# Patient Record
Sex: MALE | Race: BLACK OR AFRICAN AMERICAN | NOT HISPANIC OR LATINO | Employment: UNEMPLOYED | URBAN - METROPOLITAN AREA
[De-identification: names, ages, dates, MRNs, and addresses within clinical notes are randomized per-mention and may not be internally consistent; named-entity substitution may affect disease eponyms.]

---

## 2017-04-12 ENCOUNTER — HOSPITAL ENCOUNTER (EMERGENCY)
Facility: HOSPITAL | Age: 57
Discharge: HOME | End: 2017-04-12
Payer: MEDICAID

## 2017-04-12 VITALS
TEMPERATURE: 98 F | SYSTOLIC BLOOD PRESSURE: 130 MMHG | HEART RATE: 77 BPM | HEIGHT: 65 IN | RESPIRATION RATE: 18 BRPM | DIASTOLIC BLOOD PRESSURE: 62 MMHG | OXYGEN SATURATION: 98 % | BODY MASS INDEX: 52.48 KG/M2 | WEIGHT: 315 LBS

## 2017-04-12 DIAGNOSIS — M54.31 SCIATICA OF RIGHT SIDE: Primary | ICD-10-CM

## 2017-04-12 PROCEDURE — 63600175 PHARM REV CODE 636 W HCPCS: Performed by: NURSE PRACTITIONER

## 2017-04-12 PROCEDURE — 96372 THER/PROPH/DIAG INJ SC/IM: CPT

## 2017-04-12 PROCEDURE — 99283 EMERGENCY DEPT VISIT LOW MDM: CPT | Mod: 25

## 2017-04-12 RX ORDER — KETOROLAC TROMETHAMINE 30 MG/ML
60 INJECTION, SOLUTION INTRAMUSCULAR; INTRAVENOUS
Status: COMPLETED | OUTPATIENT
Start: 2017-04-12 | End: 2017-04-12

## 2017-04-12 RX ORDER — CYCLOBENZAPRINE HCL 10 MG
10 TABLET ORAL 3 TIMES DAILY PRN
Qty: 12 TABLET | Refills: 0 | Status: SHIPPED | OUTPATIENT
Start: 2017-04-12 | End: 2017-04-22

## 2017-04-12 RX ORDER — KETOROLAC TROMETHAMINE 10 MG/1
10 TABLET, FILM COATED ORAL EVERY 6 HOURS PRN
Qty: 12 TABLET | Refills: 0 | Status: SHIPPED | OUTPATIENT
Start: 2017-04-12 | End: 2017-05-05

## 2017-04-12 RX ORDER — ORPHENADRINE CITRATE 30 MG/ML
60 INJECTION INTRAMUSCULAR; INTRAVENOUS
Status: COMPLETED | OUTPATIENT
Start: 2017-04-12 | End: 2017-04-12

## 2017-04-12 RX ADMIN — ORPHENADRINE CITRATE 60 MG: 30 INJECTION INTRAMUSCULAR; INTRAVENOUS at 08:04

## 2017-04-12 RX ADMIN — KETOROLAC TROMETHAMINE 60 MG: 30 INJECTION, SOLUTION INTRAMUSCULAR at 08:04

## 2017-04-12 NOTE — ED AVS SNAPSHOT
[facility]  [address] [place] [place] [address]               Homer Sanches   2017  7:27 PM   ED    Description:  Male : 1960   Department:  [facility]           Your Care was Coordinated By:     Provider Role From To    Ivette Nieto NP Nurse Practitioner 17 --      Reason for Visit     Hip Pain           Diagnoses this Visit        Comments    Sciatica of right side    -  Primary       ED Disposition     None           To Do List           Follow-up Information     Schedule an appointment as soon as possible for a visit with [facility].    Specialty:  Family Medicine    Why:  If symptoms worsen, As needed    Contact information:    [address], [address]  [facility] [address] [phone]       These Medications        Disp Refills Start End    ketorolac (TORADOL) 10 mg tablet 12 tablet 0 2017     Take 1 tablet (10 mg total) by mouth every 6 (six) hours as needed for Pain. - Oral    cyclobenzaprine (FLEXERIL) 10 MG tablet 12 tablet 0 2017    Take 1 tablet (10 mg total) by mouth 3 (three) times daily as needed for Muscle spasms. - Oral      [facility]     [facility] -  Assistance  Unless otherwise directed by your provider, please contact [facility], our nurse care line that is available for  assistance.     Registered nurses in the [facility] provide: appointment scheduling, clinical advisement, health education, and other advisory services.  Call: [phone] (toll free)               Medications           Message regarding Medications     Verify the changes and/or additions to your medication regime listed below are the same as discussed with your clinician today.  If any of these changes or additions are incorrect, please notify your healthcare provider.        START taking these NEW medications         "Refills    ketorolac (TORADOL) 10 mg tablet 0    Sig: Take 1 tablet (10 mg total) by mouth every 6 (six) hours as needed for Pain.    Class: Print    Route: Oral    cyclobenzaprine (FLEXERIL) 10 MG tablet 0    Sig: Take 1 tablet (10 mg total) by mouth 3 (three) times daily as needed for Muscle spasms.    Class: Print    Route: Oral      These medications were administered today        Dose Freq    ketorolac injection 60 mg 60 mg ED 1 Time    Sig: Inject 60 mg into the muscle ED 1 Time.    Class: Normal    Route: Intramuscular    orphenadrine injection 60 mg 60 mg ED 1 Time    Sig: Inject 2 mLs (60 mg total) into the muscle ED 1 Time.    Class: Normal    Route: Intramuscular           Verify that the below list of medications is an accurate representation of the medications you are currently taking.  If none reported, the list may be blank. If incorrect, please contact your healthcare provider. Carry this list with you in case of emergency.           Current Medications     cyclobenzaprine (FLEXERIL) 10 MG tablet Take 1 tablet (10 mg total) by mouth 3 (three) times daily as needed for Muscle spasms.    ketorolac (TORADOL) 10 mg tablet Take 1 tablet (10 mg total) by mouth every 6 (six) hours as needed for Pain.    ketorolac injection 60 mg Inject 60 mg into the muscle ED 1 Time.    orphenadrine injection 60 mg Inject 2 mLs (60 mg total) into the muscle ED 1 Time.           Clinical Reference Information           Your Vitals Were     BP Pulse Temp Resp Height Weight    132/68 82 98.4 °F (36.9 °C) (Oral) 16 5' 5" (1.651 m) 154.2 kg (340 lb)    SpO2 BMI             94% 56.58 kg/m2         Allergies as of 4/12/2017     No Known Allergies      Immunizations Administered on Date of Encounter - 4/12/2017     None      ED Micro, Lab, POCT     None      ED Imaging Orders     None        Discharge Instructions         Sciatica    Sciatica is a condition that causes pain in the lower back that spreads down into the buttock, " hip, and leg. Sometimes the leg pain can happen without any back pain. Sciatica happens when a spinal nerve is irritated or has pressure put on it as comes out of the spinal canal in the lower back. This most often happens when a bulge or rupture of a nearby spinal disk presses on the nerve. Sciatica can also be caused by a narrowing of the spinal canal (spinal stenosis) or spasm of the muscle in the buttocks that the sciatic nerve passes through (pyriform muscle). Sciatica is also called lumbar radiculopathy.  Sciatica may begin after a sudden twisting or bending force, such as in a car accident. Or it can happen after a simple awkward movement. In either case, muscle spasm often also happens. Muscle spasm makes the pain worse.  A healthcare provider makes a diagnosis of sciatica from your symptoms and a physical exam. Unless you had an injury from a car accident or fall, you usually wont have X-rays taken at this time. This is because the nerves and disks in your back cant be seen on an X-ray. If the provider sees signs of a compressed nerve, you will need to schedule an MRI scan as an outpatient. Signs of a compressed nerve include loss of strength in a leg.  Most sciatica gets better with medicine, exercise, and physical therapy. If your symptoms continue after at least 3 months of medical treatment, you may need surgery or injections to your lower back.  Home care  Follow these tips when caring for yourself at home:  · You may need to stay in bed the first few days. But as soon as possible, begin sitting up or walking. This will help you avoid problems that come from staying in bed for long periods.  · When in bed, try to find a position that is comfortable. A firm mattress is best. Try lying flat on your back with pillows under your knees. You can also try lying on your side with your knees bent up toward your chest and a pillow between your knees.  · Avoid sitting for long periods. This puts more stress on  your lower back than standing or walking.  · Use heat from a hot shower, hot bath, or heating pad to help ease pain. Massage can also help. You can also try using an ice pack. You can make your own ice pack by putting ice cubes in a plastic bag. Wrap the bag in a thin towel. Try both heat and cold to see which works best. Use the method that feels best for 20 minutes several times a day.  · You may use acetaminophen or ibuprofen to ease pain, unless another pain medicine was prescribed. Note: If you have chronic liver or kidney disease, talk with your healthcare provider before taking these medicines. Also talk with your provider if youve had a stomach ulcer or gastrointestinal bleeding.  · Use safe lifting methods. Dont lift anything heavier than 15 pounds until all of the pain is gone.  Follow-up care  Follow up with your healthcare provider, or as advised. You may need physical therapy or additional tests.  If X-rays were taken, a radiologist will look at them. You will be told of any new findings that may affect your care.  When to seek medical advice  Call your healthcare provider right away if any of these occur:  · Pain gets worse even after taking prescribed medicine  · Weakness or numbness in 1 or both legs or hips  · Numbness in your groin or genital area  · You cant control your bowel or bladder  · Fever  · Redness or swelling over your back or spine   Date Last Reviewed: 8/1/2016  © 1257-8000 [employer]. [address] [place] [address]. All rights reserved. This information is not intended as a substitute for professional medical care. Always follow your healthcare professional's instructions.          [facility] Sign-Up     Activating your [facility] account is as easy as 1-2-3!     1) Visit [email or web address], select Sign Up Now, enter this activation code and your date of birth, then select Next.  [email or web address]  Expires: 5/27/2017  8:34 PM      2) Create a username and  password to use when you visit [facility] in the future and select a security question in case you lose your password and select Next.    3) Enter your e-mail address and click Sign Up!    Additional Information  If you have questions, please e-mail [email or web address] or call [phone] to talk to our [email or web address] staff. Remember, [facility] is NOT to be used for urgent needs. For medical emergencies, dial 911.          [facility] complies with applicable Federal civil rights laws and does not discriminate on the basis of race, color, national origin, age, disability, or sex.        Language Assistance Services     ATTENTION: Language assistance services are available, free of charge. Please call [phone].      ATENCIÓN: Si habla español, tiene a parker disposición servicios gratuitos de asistencia lingüística. Llame al [phone].     CHÚ Ý: N?u b?n nói Ti?ng Vi?t, có các d?ch v? h? tr? ngôn ng? mi?n phí dành cho b?n. G?i s? [phone].

## 2017-04-13 NOTE — DISCHARGE INSTRUCTIONS

## 2017-04-13 NOTE — ED PROVIDER NOTES
Encounter Date: 4/12/2017       History     Chief Complaint   Patient presents with    Hip Pain     R hip pain x 2 days, denies recent fall or injury. ambulatory in triage. pain worse when sitting/lying on side     Review of patient's allergies indicates:  No Known Allergies  Patient is a 56 y.o. male presenting with the following complaint: leg pain. The history is provided by the patient.   Leg Pain    The incident occurred at home. There was no injury mechanism. The incident occurred two days ago. The pain is present in the right hip (radiates down right leg). The quality of the pain is described as sharp. The pain has been constant since onset. Pertinent negatives include no numbness, no inability to bear weight, no loss of motion, no muscle weakness, no loss of sensation and no tingling. Exacerbated by: sitting to standing position, laying on right side. He has tried nothing for the symptoms.     History reviewed. No pertinent past medical history.  History reviewed. No pertinent surgical history.  History reviewed. No pertinent family history.  Social History   Substance Use Topics    Smoking status: Current Every Day Smoker     Types: Vaping with nicotine    Smokeless tobacco: None    Alcohol use 3.6 oz/week     6 Cans of beer per week      Comment: daily     Review of Systems   Constitutional: Negative for chills, fatigue and fever.   HENT: Negative for congestion, ear pain, sinus pressure and sore throat.    Eyes: Negative for pain and visual disturbance.   Respiratory: Negative for cough, chest tightness and shortness of breath.    Cardiovascular: Negative for chest pain.   Gastrointestinal: Negative for abdominal pain, diarrhea, nausea and vomiting.   Genitourinary: Negative for dysuria.   Musculoskeletal: Negative for back pain, gait problem and neck pain.        Hip and leg pain   Skin: Negative for color change and rash.   Neurological: Negative for dizziness, tingling, weakness and numbness.    Hematological: Negative.    Psychiatric/Behavioral: Negative for confusion.       Physical Exam   Initial Vitals   BP Pulse Resp Temp SpO2   04/12/17 1826 04/12/17 1826 04/12/17 1826 04/12/17 1826 04/12/17 1826   132/68 82 16 98.4 °F (36.9 °C) 94 %     Physical Exam    Nursing note and vitals reviewed.  Constitutional: He appears well-developed and well-nourished.   HENT:   Head: Normocephalic and atraumatic.   Eyes: EOM are normal.   Neck: Trachea normal and normal range of motion. Neck supple.   Cardiovascular: Normal rate, regular rhythm, S1 normal, S2 normal and normal heart sounds.   Pulses:       Dorsalis pedis pulses are 2+ on the right side, and 2+ on the left side.   Pulmonary/Chest: Effort normal and breath sounds normal. No respiratory distress.   Abdominal: Soft. Bowel sounds are normal. There is no tenderness.   Musculoskeletal: Normal range of motion.        Right hip: He exhibits tenderness. He exhibits normal range of motion, normal strength and no deformity.        Legs:  Neurological: He is alert and oriented to person, place, and time. He has normal strength and normal reflexes. No cranial nerve deficit or sensory deficit.   Reflex Scores:       Patellar reflexes are 2+ on the right side and 2+ on the left side.  Skin: Skin is warm and dry.         ED Course   Procedures  Labs Reviewed - No data to display          Medical Decision Making:   Differential Diagnosis:   Sciatica, back pain, muscle strain  ED Management:  PE, IM injection, RX              Attending Attestation:     Physician Attestation Statement for NP/PA:   I discussed this assessment and plan of this patient with the NP/PA, but I did not personally examine the patient. The face to face encounter was performed by the NP/PA.                  ED Course     Clinical Impression:   The encounter diagnosis was Sciatica of right side.    Disposition:   Disposition: Discharged  Condition: Stable       Ivette Nieto NP  04/12/17  2035       Norma Ignacio MD  05/01/17 1016

## 2017-05-05 ENCOUNTER — HOSPITAL ENCOUNTER (EMERGENCY)
Facility: HOSPITAL | Age: 57
Discharge: HOME | End: 2017-05-05
Attending: EMERGENCY MEDICINE
Payer: MEDICAID

## 2017-05-05 VITALS
RESPIRATION RATE: 20 BRPM | HEART RATE: 65 BPM | TEMPERATURE: 97 F | OXYGEN SATURATION: 97 % | HEIGHT: 67 IN | WEIGHT: 315 LBS | DIASTOLIC BLOOD PRESSURE: 71 MMHG | SYSTOLIC BLOOD PRESSURE: 128 MMHG | BODY MASS INDEX: 49.44 KG/M2

## 2017-05-05 DIAGNOSIS — M25.552 LEFT HIP PAIN: Primary | ICD-10-CM

## 2017-05-05 DIAGNOSIS — R52 PAIN: ICD-10-CM

## 2017-05-05 DIAGNOSIS — R93.7 ABNORMAL X-RAY OF LUMBAR SPINE: ICD-10-CM

## 2017-05-05 LAB
ANION GAP SERPL CALC-SCNC: 9 MMOL/L
BASOPHILS # BLD AUTO: 0.01 K/UL
BASOPHILS NFR BLD: 0.2 %
BILIRUB UR QL STRIP: NEGATIVE
BUN SERPL-MCNC: 16 MG/DL
CALCIUM SERPL-MCNC: 9.7 MG/DL
CHLORIDE SERPL-SCNC: 107 MMOL/L
CLARITY UR: CLEAR
CO2 SERPL-SCNC: 26 MMOL/L
COLOR UR: YELLOW
CREAT SERPL-MCNC: 1.1 MG/DL
DIFFERENTIAL METHOD: ABNORMAL
EOSINOPHIL # BLD AUTO: 0.2 K/UL
EOSINOPHIL NFR BLD: 2.7 %
ERYTHROCYTE [DISTWIDTH] IN BLOOD BY AUTOMATED COUNT: 12.4 %
EST. GFR  (AFRICAN AMERICAN): >60 ML/MIN/1.73 M^2
EST. GFR  (NON AFRICAN AMERICAN): >60 ML/MIN/1.73 M^2
GLUCOSE SERPL-MCNC: 98 MG/DL
GLUCOSE UR QL STRIP: NEGATIVE
HCT VFR BLD AUTO: 46.3 %
HGB BLD-MCNC: 15.8 G/DL
HGB UR QL STRIP: ABNORMAL
KETONES UR QL STRIP: NEGATIVE
LEUKOCYTE ESTERASE UR QL STRIP: NEGATIVE
LYMPHOCYTES # BLD AUTO: 1.7 K/UL
LYMPHOCYTES NFR BLD: 30.6 %
MCH RBC QN AUTO: 32.6 PG
MCHC RBC AUTO-ENTMCNC: 34.1 %
MCV RBC AUTO: 96 FL
MONOCYTES # BLD AUTO: 0.5 K/UL
MONOCYTES NFR BLD: 8.6 %
NEUTROPHILS # BLD AUTO: 3.2 K/UL
NEUTROPHILS NFR BLD: 57.7 %
NITRITE UR QL STRIP: NEGATIVE
PH UR STRIP: 6 [PH] (ref 5–8)
PLATELET # BLD AUTO: 186 K/UL
PMV BLD AUTO: 9.3 FL
POTASSIUM SERPL-SCNC: 4.1 MMOL/L
PROT UR QL STRIP: ABNORMAL
RBC # BLD AUTO: 4.85 M/UL
SODIUM SERPL-SCNC: 142 MMOL/L
SP GR UR STRIP: 1.02 (ref 1–1.03)
URN SPEC COLLECT METH UR: ABNORMAL
UROBILINOGEN UR STRIP-ACNC: 1 EU/DL
WBC # BLD AUTO: 5.46 K/UL

## 2017-05-05 PROCEDURE — 63600175 PHARM REV CODE 636 W HCPCS: Performed by: PHYSICIAN ASSISTANT

## 2017-05-05 PROCEDURE — 85025 COMPLETE CBC W/AUTO DIFF WBC: CPT

## 2017-05-05 PROCEDURE — 80048 BASIC METABOLIC PNL TOTAL CA: CPT

## 2017-05-05 PROCEDURE — 81003 URINALYSIS AUTO W/O SCOPE: CPT

## 2017-05-05 PROCEDURE — 99284 EMERGENCY DEPT VISIT MOD MDM: CPT | Mod: 25

## 2017-05-05 PROCEDURE — 96372 THER/PROPH/DIAG INJ SC/IM: CPT

## 2017-05-05 RX ORDER — NAPROXEN 500 MG/1
500 TABLET ORAL 2 TIMES DAILY WITH MEALS
Qty: 20 TABLET | Refills: 0 | Status: SHIPPED | OUTPATIENT
Start: 2017-05-05 | End: 2022-06-27 | Stop reason: DRUGHIGH

## 2017-05-05 RX ORDER — KETOROLAC TROMETHAMINE 30 MG/ML
30 INJECTION, SOLUTION INTRAMUSCULAR; INTRAVENOUS
Status: COMPLETED | OUTPATIENT
Start: 2017-05-05 | End: 2017-05-05

## 2017-05-05 RX ADMIN — KETOROLAC TROMETHAMINE 30 MG: 30 INJECTION, SOLUTION INTRAMUSCULAR at 10:05

## 2017-05-05 NOTE — ED PROVIDER NOTES
Encounter Date: 5/5/2017       History     Chief Complaint   Patient presents with    Abdominal Pain     LLQ x1 week accompanied by dysuria; denies fever, nausea, vomiting, diarrhea and LBM was this morning; described as sharp pain; was taking goodys which gave minimal relief but is no longer relieving pain; once patient stood up at triage and pointed to where pain was located pt pointed to left hip;      Review of patient's allergies indicates:  No Known Allergies  HPI Comments:  Homer Sanches 56 y.o. nontoxic/afebrile male with no reported PMH  presented to the ED with C/O LLQ abdominal pain for the past one week. He states that this pain is an intermittent sharp pain with movements and certain positions causing exacerbation. He works as  and is bending and lifting often which too worsens pain.  He does report intermittent dysuria. H describes this pain as similar and has been to this ED for the pain in the past. He denies any fever, chills, N/V/D, hematuria, penile pain, scrotal pain or rash. He has tried Goodies in the past however did not try any medication for the pain today.     The history is provided by the patient.     History reviewed. No pertinent past medical history.  History reviewed. No pertinent surgical history.  History reviewed. No pertinent family history.  Social History   Substance Use Topics    Smoking status: Current Every Day Smoker     Types: Vaping with nicotine    Smokeless tobacco: Current User    Alcohol use 3.6 oz/week     6 Cans of beer per week      Comment: daily     Review of Systems   Constitutional: Negative for activity change, appetite change, chills and fever.   HENT: Negative for sore throat.    Respiratory: Negative for cough and shortness of breath.    Cardiovascular: Negative for chest pain.   Gastrointestinal: Positive for abdominal pain. Negative for constipation, diarrhea, nausea and vomiting.        LLQ   Genitourinary: Negative for dysuria, flank  pain and hematuria.   Musculoskeletal: Positive for arthralgias. Negative for back pain, joint swelling and myalgias.        Left hip pain   Skin: Negative for rash and wound.   Neurological: Negative for weakness and numbness.   Hematological: Does not bruise/bleed easily.   All other systems reviewed and are negative.      Physical Exam   Initial Vitals   BP Pulse Resp Temp SpO2   05/05/17 0934 05/05/17 0934 05/05/17 0934 05/05/17 0934 05/05/17 0934   149/68 89 20 97.4 °F (36.3 °C) 95 %     Physical Exam    Nursing note and vitals reviewed.  Constitutional: Vital signs are normal. He appears well-developed and well-nourished. He is cooperative.  Non-toxic appearance. He does not appear ill. No distress.   HENT:   Head: Normocephalic and atraumatic.   Eyes: Conjunctivae and lids are normal.   Neck: Trachea normal and normal range of motion. Neck supple. No stridor present. No tracheal deviation present.   Cardiovascular: Normal rate and regular rhythm.   Pulmonary/Chest: Breath sounds normal. No respiratory distress. He has no wheezes. He has no rhonchi.   Abdominal: Soft. Normal appearance and bowel sounds are normal. There is no tenderness. There is no rigidity, no rebound and no guarding.   Musculoskeletal: Normal range of motion.        Left hip: He exhibits tenderness. He exhibits normal range of motion, normal strength and no deformity.        Legs:  Pain with flexion and abduction of the left hip with FROM of the affected joint. TTP of the anterior left hip with no deformity, edema or crepitus.   Neurological: He is alert and oriented to person, place, and time. He has normal strength. GCS eye subscore is 4. GCS verbal subscore is 5. GCS motor subscore is 6.   Skin: Skin is warm, dry and intact. No rash noted.   Psychiatric: He has a normal mood and affect. His speech is normal and behavior is normal. Thought content normal.         ED Course   Procedures  Labs Reviewed   URINALYSIS - Abnormal; Notable for  the following:        Result Value    Protein, UA Trace (*)     Occult Blood UA Trace (*)     All other components within normal limits   CBC W/ AUTO DIFFERENTIAL   BASIC METABOLIC PANEL     Imaging Results         X-Ray Hip 2 View Left (Final result) Result time:  05/05/17 11:04:17    Final result by Lisa Richard MD (05/05/17 11:04:17)    Impression:     Mild osteoarthritis of the left hip.      Electronically signed by: LISA RICHARD MD  Date:     05/05/17  Time:    11:04     Narrative:      EXAM: Left hip    CLINICAL INDICATION:  Pain.    TECHNIQUE: Neutral and frog leg views of the left hip were obtained.    COMPARISON:None.    FINDINGS: There is no evidence of acute fracture, dislocation, or bone destruction.  There is mild joint space narrowing and subchondral sclerosis consistent with early osteoarthritis.            X-Ray Abdomen Flat And Erect (Final result)    Abnormal Result time:  05/05/17 11:03:30    Final result by Lisa Richard MD (05/05/17 11:03:30)    Impression:      1.  No dilated bowel loops to suggest obstruction.  2. A sclerotic area at the right lumbosacral junction is favored to reflect hypertrophic degenerative change. However osteoblastic lesion could have a similar appearance.  If there is clinical concern for malignancy, CT or bone scan could be utilized for further evaluation.    Epic notification system activated.       Electronically signed by: LISA RICHARD MD  Date:     05/05/17  Time:    11:03     Narrative:    EXAM: ABDOMEN FLAT AND ERECT    CLINICAL INDICATION:  Pain.    TECHNIQUE:  Supine and upright views of the abdomen were obtained.    FINDINGS:  No dilated bowel loops are seen.   There is no evidence of subdiaphragmatic free air. There is no evidence of mass effect.  No abnormal calcifications are detected.    A sclerotic area at the lumbosacral junction on the right is favored to reflect hypertrophic degenerative change, possibly related to transitional  lumbosacral anatomy. However osteoblastic lesion could have a similar appearance.  If there is clinical concern for malignancy, CT or bone scan could be utilized for further evaluation.                Homer Sanches 56 y.o. nontoxic/afebrile male with no reported PMH  presented to the ED with C/O LLQ abdominal pain for the past one week. He states that this pain is an intermittent sharp pain with movements and certain positions causing exacerbation. He works as  and is bending and lifting often which too worsens pain.  He does report intermittent dysuria. H describes this pain as similar and has been to this ED for the pain in the past. He denies any fever, chills, N/V/D, hematuria, penile pain, scrotal pain or rash. He has tried Goodies in the past however did not try any medication for the pain today.  ROS positive for left lower abdominal pain.  Physical exam reveals obese patient in no obvious distress exhibiting slowed but steady gait to room. Lungs clear, heart regular rate and rhythm. Abdomen is soft with no TTP, no mass, rebound, rigidity or CVA tenderness; bowel sound normal. TTP of the left hip joint with pain on abduction of the area; no crepitus, edema, bony deformity noted. No hernia or inguinal adenopathy. Strength 5/5 bilaterally and neurovascularly intact. Skin free of rash.    DDX: UTI, kidney stone, constipation, obstruction, arthritic pain, AVN    ED management: labs and UA WNL; left hip x-ray consistent with early arthritis and is likely etiology of pain with some improvement in pain with Toradol. Abdominal x-ray showing abnormality of the right lumbosacral region; this does not correlate with patient's pain today or any PE findings. instructed patient to follow up regarding this.     Impression/Plan: Patient informed of diagnosis The primary encounter diagnosis was Left hip pain. Diagnoses of Pain and Abnormal x-ray of lumbar spine were also pertinent to this visit.  Discharged with  naprosyn. Patient will follow up with Primary.  Patient cautioned on when to return to ED.  Pt. Understands and agrees with current treatment plan                      Attending Attestation:     Physician Attestation Statement for NP/PA:   I have conducted a face to face encounter with this patient in addition to the NP/PA, due to NP/PA Request    Other NP/PA Attestation Additions:    History of Present Illness: AGREE:  PT HAS BEEN SEEN FOR SIMILAR SYMPTOMS IN THE ED LAST YEAR AND RIGHT HIP PAIN 1 MONTH AGO.  PT WAS INSTRUCTED TO FOLLOW UP WITH [facility].  HE REPORTS THAT HE DID NOT SCHEDULE FOLLOW UP BECAUSE HE LOST HIS PAPERWORK.      NO FEVER, CHILLS.  NO N/V.  NO DIARRHEA OR CONSTIPATION.  NO NUMBNESS/TINGLING/WEAKNESS.  MEDS GIVEN AT LAST VISIT WERE HELPFUL FOR PAIN   Physical Exam: AGREE:  57 Y/O OBESE MALE WITH NO ABD PAIN ON EXAM OF ABD.  I AGREE WITH PA'S MUSCULOSKELETAL EXAM.    Medical Decision Making: AGREE:  PT INSTRUCTED TO CALL [facility] TODAY TO SCHEDULE FOLLOW UP    Pt counseled on their diagnosis and the importance of following up with PCP.  Pt also cautioned on when to return to ED.  Pt verbalizes understanding of discharge plan and will return to ED immediately if symptoms worsen                   ED Course     Clinical Impression:   The primary encounter diagnosis was Left hip pain. A diagnosis of Pain was also pertinent to this visit.    Disposition:   Disposition: Discharged  Condition: Stable       AQUILES Aguirre  05/08/17 0802       Leydi Martinez MD  05/08/17 1546

## 2017-05-05 NOTE — ED AVS SNAPSHOT
[facility]  [address] [place] [address]               Homer Sanches   2017 10:00 AM   ED    Description:  Male : 1960   Department:  [facility]           Your Care was Coordinated By:     Provider Role From To    Leydi Martinez MD Attending Provider 17 [phone] --    AQUILES Aguirre Physician Assistant [phone] --      Reason for Visit     Abdominal Pain           Diagnoses this Visit        Comments    Left hip pain    -  Primary     Pain           ED Disposition     None           To Do List           Follow-up Information     Follow up with [facility].    Specialty:  Family Medicine    Contact information:    [address], [address]  [facility] [address] [phone]       These Medications        Disp Refills Start End    naproxen (NAPROSYN) 500 MG tablet 20 tablet 0 2017     Take 1 tablet (500 mg total) by mouth 2 (two) times daily with meals. - Oral      [facility]     [facility] Care [facility] -  Assistance  Unless otherwise directed by your provider, please contact [facility], our nurse care line that is available for  assistance.     Registered nurses in the [facility] provide: appointment scheduling, clinical advisement, health education, and other advisory services.  Call: [phone] (toll free)               Medications           Message regarding Medications     Verify the changes and/or additions to your medication regime listed below are the same as discussed with your clinician today.  If any of these changes or additions are incorrect, please notify your healthcare provider.        START taking these NEW medications        Refills    naproxen (NAPROSYN) 500 MG tablet 0    Sig: Take 1 tablet (500 mg total) by mouth 2 (two) times daily with meals.    Class: Print    Route: Oral      These medications were  "administered today        Dose Freq    ketorolac injection 30 mg 30 mg ED 1 Time    Sig: Inject 30 mg into the muscle ED 1 Time.    Class: Normal    Route: Intramuscular    Cosign for Ordering: Required by Jacinto Lara MD      STOP taking these medications     ketorolac (TORADOL) 10 mg tablet Take 1 tablet (10 mg total) by mouth every 6 (six) hours as needed for Pain.           Verify that the below list of medications is an accurate representation of the medications you are currently taking.  If none reported, the list may be blank. If incorrect, please contact your healthcare provider. Carry this list with you in case of emergency.           Current Medications     naproxen (NAPROSYN) 500 MG tablet Take 1 tablet (500 mg total) by mouth 2 (two) times daily with meals.           Clinical Reference Information           Your Vitals Were     BP Pulse Temp Resp Height Weight    149/68 (BP Location: Right arm, Patient Position: Sitting) 89 97.4 °F (36.3 °C) (Oral) 20 5' 7" (1.702 m) 152 kg (335 lb)    SpO2 BMI             95% 52.47 kg/m2         Allergies as of 5/5/2017     No Known Allergies      Immunizations Administered on Date of Encounter - 5/5/2017     None      ED Micro, Lab, POCT     Start Ordered       Status Ordering Provider    05/05/17 1109 05/05/17 1109  CBC auto differential  STAT      Final result     05/05/17 1109 05/05/17 1109  Basic metabolic panel  STAT      Final result     05/05/17 1033 05/05/17 1033  Urinalysis  STAT      Final result       ED Imaging Orders     Start Ordered       Status Ordering Provider    05/05/17 1034 05/05/17 1033  X-Ray Abdomen Flat And Erect  1 time imaging      Final result     05/05/17 1034 05/05/17 1033  X-Ray Hip 2 View Left  1 time imaging      Final result         Discharge Instructions         Arthralgia    Arthralgia is the term for pain in or around the joint. It is a symptom, not a disease. This pain may involve one or more joints. In some cases, the pain " moves from joint to joint.  There are many causes for joint pain. These include:  · Injury  · Osteoarthritis (wearing out of the joint surface)  · Gout (inflammation of the joint due to crystals in the joint fluid)  · Infection inside the joint    · Bursitis (inflammation of the fluid-filled sacs around the joint)  · Autoimmune disorders such as rheumatoid arthritis or lupus  · Tendonitis (inflamation of chords that attach muscle to bone)  Home care  · Rest the involved joint(s) until your symptoms improve.   · You may be prescribed pain medication. If none is prescribed, you may use acetaminophen or ibuprofen to control pain and inflammation.  Follow up  Follow up with your healthcare provider or our staff as advised.  When to seek medical care  Contact your healthcare provider right away if any of the following occurs:  · Pain, swelling, or redness of joint increases  · Pain worsens or recurs after a period of improvement  · Pain moves to other joints  · You cannot bear weight on the affected joint   · You cannot move the affected joint  · Joint appears deformed  · New rash appears  · Fever of 101ºF (38.8ºC) or higher, or as directed by your healthcare provider  Date Last Reviewed: 4/26/2015  © 0435-8772 [employer]. [address] [place] [address]. All rights reserved. This information is not intended as a substitute for professional medical care. Always follow your healthcare professional's instructions.          Discharge References/Attachments     ARTHRALGIA (ENGLISH)      [facility] Sign-Up     Activating your [facility] account is as easy as 1-2-3!     1) Visit [email or web address], select Sign Up Now, enter this activation code and your date of birth, then select Next.  [email or web address]  Expires: 5/27/2017  8:34 PM      2) Create a username and password to use when you visit [facility] in the future and select a security question in case you lose your password and select Next.    3) Enter  your e-mail address and click Sign Up!    Additional Information  If you have questions, please e-mail [email or web address] or call [phone] to talk to our [facility] staff. Remember, [facility] is NOT to be used for urgent needs. For medical emergencies, dial 911.         Smoking Cessation     If you would like to quit smoking:   You may be eligible for free services if you are a [place] resident and started smoking cigarettes before September 1, 1988.  Call the Smoking Cessation Trust (SCT) toll free at (983) 577-4375 or (311) 866-8793.   Call 7-824-QUIT-NOW if you do not meet the above criteria.   Contact us via email: [email or web address]   View our website for more information: [email or web address]         [facility] complies with applicable Federal civil rights laws and does not discriminate on the basis of race, color, national origin, age, disability, or sex.        Language Assistance Services     ATTENTION: Language assistance services are available, free of charge. Please call [phone].      ATENCIÓN: Si habla español, tiene a parker disposición servicios gratuitos de asistencia lingüística. Llame al [phone].     CHÚ Ý: N?u b?n nói Ti?ng Vi?t, có các d?ch v? h? tr? ngôn ng? mi?n phí dành cho b?n. G?i s? [phone].

## 2017-05-05 NOTE — DISCHARGE INSTRUCTIONS
Arthralgia    Arthralgia is the term for pain in or around the joint. It is a symptom, not a disease. This pain may involve one or more joints. In some cases, the pain moves from joint to joint.  There are many causes for joint pain. These include:  · Injury  · Osteoarthritis (wearing out of the joint surface)  · Gout (inflammation of the joint due to crystals in the joint fluid)  · Infection inside the joint    · Bursitis (inflammation of the fluid-filled sacs around the joint)  · Autoimmune disorders such as rheumatoid arthritis or lupus  · Tendonitis (inflamation of chords that attach muscle to bone)  Home care  · Rest the involved joint(s) until your symptoms improve.   · You may be prescribed pain medication. If none is prescribed, you may use acetaminophen or ibuprofen to control pain and inflammation.  Follow up  Follow up with your healthcare provider or our staff as advised.  When to seek medical care  Contact your healthcare provider right away if any of the following occurs:  · Pain, swelling, or redness of joint increases  · Pain worsens or recurs after a period of improvement  · Pain moves to other joints  · You cannot bear weight on the affected joint   · You cannot move the affected joint  · Joint appears deformed  · New rash appears  · Fever of 101ºF (38.8ºC) or higher, or as directed by your healthcare provider  Date Last Reviewed: 4/26/2015  © 4936-3973 [employer]. [address] [place] [address], [place] [address]. All rights reserved. This information is not intended as a substitute for professional medical care. Always follow your healthcare professional's instructions.

## 2019-06-19 ENCOUNTER — HOSPITAL ENCOUNTER (EMERGENCY)
Facility: HOSPITAL | Age: 59
Discharge: HOME | End: 2019-06-20
Attending: EMERGENCY MEDICINE
Payer: MEDICAID

## 2019-06-19 DIAGNOSIS — R05.9 COUGH: ICD-10-CM

## 2019-06-19 DIAGNOSIS — J40 BRONCHITIS: ICD-10-CM

## 2019-06-19 DIAGNOSIS — M79.602 LEFT ARM PAIN: ICD-10-CM

## 2019-06-19 DIAGNOSIS — J44.0 CHRONIC OBSTRUCTIVE PULMONARY DISEASE WITH ACUTE LOWER RESPIRATORY INFECTION: ICD-10-CM

## 2019-06-19 DIAGNOSIS — J18.9 PNEUMONIA OF LEFT LOWER LOBE DUE TO INFECTIOUS ORGANISM: ICD-10-CM

## 2019-06-19 DIAGNOSIS — M54.12 CERVICAL RADICULOPATHY: Primary | ICD-10-CM

## 2019-06-19 LAB
ALLENS TEST: ABNORMAL
DELSYS: ABNORMAL
HCO3 UR-SCNC: 26.5 MMOL/L (ref 24–28)
PCO2 BLDA: 40.5 MMHG (ref 35–45)
PH SMN: 7.42 [PH] (ref 7.35–7.45)
PO2 BLDA: 50 MMHG (ref 80–100)
POC BE: 2 MMOL/L
POC SATURATED O2: 86 % (ref 95–100)
POC TCO2: 28 MMOL/L (ref 23–27)
SAMPLE: ABNORMAL
SITE: ABNORMAL

## 2019-06-19 PROCEDURE — 93005 ELECTROCARDIOGRAM TRACING: CPT

## 2019-06-19 PROCEDURE — 93010 ELECTROCARDIOGRAM REPORT: CPT | Mod: ,,, | Performed by: STUDENT IN AN ORGANIZED HEALTH CARE EDUCATION/TRAINING PROGRAM

## 2019-06-19 PROCEDURE — 93010 EKG 12-LEAD: ICD-10-PCS | Mod: ,,, | Performed by: STUDENT IN AN ORGANIZED HEALTH CARE EDUCATION/TRAINING PROGRAM

## 2019-06-19 PROCEDURE — 96372 THER/PROPH/DIAG INJ SC/IM: CPT | Mod: 59

## 2019-06-19 PROCEDURE — 99900035 HC TECH TIME PER 15 MIN (STAT)

## 2019-06-19 PROCEDURE — 36600 WITHDRAWAL OF ARTERIAL BLOOD: CPT

## 2019-06-19 PROCEDURE — 94640 AIRWAY INHALATION TREATMENT: CPT

## 2019-06-19 PROCEDURE — 99285 EMERGENCY DEPT VISIT HI MDM: CPT | Mod: 25

## 2019-06-19 PROCEDURE — 82803 BLOOD GASES ANY COMBINATION: CPT

## 2019-06-19 PROCEDURE — 63600175 PHARM REV CODE 636 W HCPCS: Performed by: EMERGENCY MEDICINE

## 2019-06-19 PROCEDURE — 25000242 PHARM REV CODE 250 ALT 637 W/ HCPCS: Performed by: EMERGENCY MEDICINE

## 2019-06-19 RX ORDER — ALBUTEROL SULFATE 90 UG/1
1-2 AEROSOL, METERED RESPIRATORY (INHALATION) EVERY 6 HOURS PRN
Qty: 1 INHALER | Refills: 0 | Status: SHIPPED | OUTPATIENT
Start: 2019-06-19 | End: 2022-06-27

## 2019-06-19 RX ORDER — DEXAMETHASONE SODIUM PHOSPHATE 4 MG/ML
8 INJECTION, SOLUTION INTRA-ARTICULAR; INTRALESIONAL; INTRAMUSCULAR; INTRAVENOUS; SOFT TISSUE
Status: COMPLETED | OUTPATIENT
Start: 2019-06-19 | End: 2019-06-19

## 2019-06-19 RX ORDER — PROCHLORPERAZINE EDISYLATE 5 MG/ML
10 INJECTION INTRAMUSCULAR; INTRAVENOUS
Status: COMPLETED | OUTPATIENT
Start: 2019-06-19 | End: 2019-06-19

## 2019-06-19 RX ORDER — IPRATROPIUM BROMIDE AND ALBUTEROL SULFATE 2.5; .5 MG/3ML; MG/3ML
3 SOLUTION RESPIRATORY (INHALATION)
Status: COMPLETED | OUTPATIENT
Start: 2019-06-19 | End: 2019-06-19

## 2019-06-19 RX ORDER — KETOROLAC TROMETHAMINE 30 MG/ML
30 INJECTION, SOLUTION INTRAMUSCULAR; INTRAVENOUS
Status: COMPLETED | OUTPATIENT
Start: 2019-06-19 | End: 2019-06-19

## 2019-06-19 RX ORDER — CYCLOBENZAPRINE HCL 10 MG
10 TABLET ORAL EVERY 8 HOURS PRN
Qty: 14 TABLET | Refills: 0 | Status: SHIPPED | OUTPATIENT
Start: 2019-06-19 | End: 2019-06-24

## 2019-06-19 RX ADMIN — DEXAMETHASONE SODIUM PHOSPHATE 8 MG: 4 INJECTION, SOLUTION INTRAMUSCULAR; INTRAVENOUS at 11:06

## 2019-06-19 RX ADMIN — IPRATROPIUM BROMIDE AND ALBUTEROL SULFATE 3 ML: .5; 3 SOLUTION RESPIRATORY (INHALATION) at 11:06

## 2019-06-19 RX ADMIN — IPRATROPIUM BROMIDE AND ALBUTEROL SULFATE 3 ML: .5; 3 SOLUTION RESPIRATORY (INHALATION) at 10:06

## 2019-06-19 RX ADMIN — KETOROLAC TROMETHAMINE 30 MG: 30 INJECTION, SOLUTION INTRAMUSCULAR at 09:06

## 2019-06-19 RX ADMIN — PROCHLORPERAZINE EDISYLATE 10 MG: 5 INJECTION INTRAMUSCULAR; INTRAVENOUS at 09:06

## 2019-06-20 VITALS
SYSTOLIC BLOOD PRESSURE: 145 MMHG | HEART RATE: 60 BPM | OXYGEN SATURATION: 95 % | WEIGHT: 315 LBS | BODY MASS INDEX: 49.44 KG/M2 | HEIGHT: 67 IN | TEMPERATURE: 98 F | RESPIRATION RATE: 20 BRPM | DIASTOLIC BLOOD PRESSURE: 68 MMHG

## 2019-06-20 LAB
ALBUMIN SERPL BCP-MCNC: 3.5 G/DL (ref 3.5–5.2)
ALP SERPL-CCNC: 60 U/L (ref 55–135)
ALT SERPL W/O P-5'-P-CCNC: 16 U/L (ref 10–44)
ANION GAP SERPL CALC-SCNC: 11 MMOL/L (ref 8–16)
AST SERPL-CCNC: 21 U/L (ref 10–40)
BASOPHILS # BLD AUTO: 0.01 K/UL (ref 0–0.2)
BASOPHILS NFR BLD: 0.1 % (ref 0–1.9)
BILIRUB SERPL-MCNC: 0.2 MG/DL (ref 0.1–1)
BNP SERPL-MCNC: 54 PG/ML (ref 0–99)
BUN SERPL-MCNC: 16 MG/DL (ref 6–20)
CALCIUM SERPL-MCNC: 9.8 MG/DL (ref 8.7–10.5)
CHLORIDE SERPL-SCNC: 103 MMOL/L (ref 95–110)
CO2 SERPL-SCNC: 26 MMOL/L (ref 23–29)
CREAT SERPL-MCNC: 1.4 MG/DL (ref 0.5–1.4)
DIFFERENTIAL METHOD: ABNORMAL
EOSINOPHIL # BLD AUTO: 0.3 K/UL (ref 0–0.5)
EOSINOPHIL NFR BLD: 3.7 % (ref 0–8)
ERYTHROCYTE [DISTWIDTH] IN BLOOD BY AUTOMATED COUNT: 12.5 % (ref 11.5–14.5)
EST. GFR  (AFRICAN AMERICAN): >60 ML/MIN/1.73 M^2
EST. GFR  (NON AFRICAN AMERICAN): 56 ML/MIN/1.73 M^2
GLUCOSE SERPL-MCNC: 99 MG/DL (ref 70–110)
HCT VFR BLD AUTO: 44 % (ref 40–54)
HGB BLD-MCNC: 14.9 G/DL (ref 14–18)
LYMPHOCYTES # BLD AUTO: 1.8 K/UL (ref 1–4.8)
LYMPHOCYTES NFR BLD: 26 % (ref 18–48)
MCH RBC QN AUTO: 32.3 PG (ref 27–31)
MCHC RBC AUTO-ENTMCNC: 33.9 G/DL (ref 32–36)
MCV RBC AUTO: 95 FL (ref 82–98)
MONOCYTES # BLD AUTO: 0.6 K/UL (ref 0.3–1)
MONOCYTES NFR BLD: 9 % (ref 4–15)
NEUTROPHILS # BLD AUTO: 4.1 K/UL (ref 1.8–7.7)
NEUTROPHILS NFR BLD: 60.9 % (ref 38–73)
PLATELET # BLD AUTO: 203 K/UL (ref 150–350)
PMV BLD AUTO: 9 FL (ref 9.2–12.9)
POTASSIUM SERPL-SCNC: 4.3 MMOL/L (ref 3.5–5.1)
PROT SERPL-MCNC: 7.5 G/DL (ref 6–8.4)
RBC # BLD AUTO: 4.61 M/UL (ref 4.6–6.2)
SODIUM SERPL-SCNC: 140 MMOL/L (ref 136–145)
TROPONIN I SERPL DL<=0.01 NG/ML-MCNC: 0.01 NG/ML (ref 0–0.03)
WBC # BLD AUTO: 6.8 K/UL (ref 3.9–12.7)

## 2019-06-20 PROCEDURE — 25000003 PHARM REV CODE 250: Performed by: EMERGENCY MEDICINE

## 2019-06-20 PROCEDURE — 85025 COMPLETE CBC W/AUTO DIFF WBC: CPT

## 2019-06-20 PROCEDURE — 84484 ASSAY OF TROPONIN QUANT: CPT

## 2019-06-20 PROCEDURE — 83880 ASSAY OF NATRIURETIC PEPTIDE: CPT

## 2019-06-20 PROCEDURE — 80053 COMPREHEN METABOLIC PANEL: CPT

## 2019-06-20 PROCEDURE — 25500020 PHARM REV CODE 255: Performed by: EMERGENCY MEDICINE

## 2019-06-20 RX ORDER — ASPIRIN 325 MG
325 TABLET ORAL
Status: COMPLETED | OUTPATIENT
Start: 2019-06-20 | End: 2019-06-20

## 2019-06-20 RX ORDER — AZITHROMYCIN 250 MG/1
500 TABLET, FILM COATED ORAL
Status: COMPLETED | OUTPATIENT
Start: 2019-06-20 | End: 2019-06-20

## 2019-06-20 RX ORDER — AZITHROMYCIN 250 MG/1
250 TABLET, FILM COATED ORAL DAILY
Qty: 4 TABLET | Refills: 0 | Status: SHIPPED | OUTPATIENT
Start: 2019-06-20 | End: 2022-06-27

## 2019-06-20 RX ORDER — PREDNISONE 50 MG/1
50 TABLET ORAL DAILY
Qty: 3 TABLET | Refills: 0 | Status: SHIPPED | OUTPATIENT
Start: 2019-06-21 | End: 2019-06-24

## 2019-06-20 RX ADMIN — AZITHROMYCIN 500 MG: 250 TABLET, FILM COATED ORAL at 02:06

## 2019-06-20 RX ADMIN — IOHEXOL 100 ML: 350 INJECTION, SOLUTION INTRAVENOUS at 01:06

## 2019-06-20 RX ADMIN — NITROGLYCERIN 0.5 INCH: 20 OINTMENT TOPICAL at 12:06

## 2019-06-20 RX ADMIN — ASPIRIN 325 MG ORAL TABLET 325 MG: 325 PILL ORAL at 12:06

## 2019-06-20 NOTE — ED NOTES
PT presents to the ED with c/o numbness to left arm and left leg x1 week. Pt states he was moving a sofa 1 week ago and felt sore and numb the next day. Pt reports numbness is intermittent.

## 2019-06-20 NOTE — ED NOTES
Patient taken off oxygen for assessment. O2 sat immediately dropped to 88-89% on room air. Let MD know. Will order an ABG

## 2019-06-20 NOTE — ED NOTES
Nurse let patient know he would remain off oxygen until respiratory collected ABG. Patient dropped to 85%. Patient is asymptomatic

## 2019-06-20 NOTE — ED PROVIDER NOTES
Encounter Date: 6/19/2019       History     Chief Complaint   Patient presents with    Numbness     Pt. c/o numbness to left arm pain and numbness that comes and goes for the past week. Pt. states the pain starts from the neck. He moved some furniture last week and the pain began 2-3 days later to arm.     58-year-old male presents emergency department complaining of several days of a left upper extremity pain and paresthesias.  States he began about a week ago, after he was picking up and moving some furniture.  States the symptoms have been persistent, prompting him to come in this evening, although they have not particularly worsened.  Reports that come with certain movements and positions.  Describes a radiating and throbbing pain from his left lateral neck down his arm and into his left upper chest wall.  It seems to be worse with certain movements and positions of his neck and left upper extremity.  No relief with goodies powder, which I have recommended he stop taking.  Denies any focal weakness, and the numbness he describes are actually paresthesias, not numbness, states he has intact sensation, just has a pins and needles feeling from time to time.  No recent trauma. On ROS, px also reports some cough and congestion that has made his symptoms somewhat worse. Denies fever. Cough is nonproductive. No other symptoms reported.        Review of patient's allergies indicates:  No Known Allergies  History reviewed. No pertinent past medical history.  History reviewed. No pertinent surgical history.  History reviewed. No pertinent family history.  Social History     Tobacco Use    Smoking status: Current Every Day Smoker     Types: Vaping with nicotine    Smokeless tobacco: Current User   Substance Use Topics    Alcohol use: Yes     Alcohol/week: 3.6 oz     Types: 6 Cans of beer per week     Comment: daily    Drug use: Yes     Types: Marijuana     Review of Systems   Constitutional: Negative for chills and  fever.   HENT: Positive for congestion. Negative for sore throat and voice change.    Respiratory: Positive for cough. Negative for choking and shortness of breath.    Cardiovascular: Negative for palpitations and leg swelling.   Gastrointestinal: Negative for abdominal pain, nausea and vomiting.   Musculoskeletal: Positive for neck pain. Negative for back pain and neck stiffness.   Neurological: Negative for light-headedness, numbness and headaches.       Physical Exam     Initial Vitals   BP Pulse Resp Temp SpO2   06/19/19 2036 06/19/19 2036 06/19/19 2036 06/19/19 2037 06/19/19 2036   135/81 81 18 98.4 °F (36.9 °C) (!) 93 %      MAP       --                Physical Exam    Nursing note and vitals reviewed.  Constitutional: He appears well-developed and well-nourished. No distress.   HENT:   Head: Normocephalic and atraumatic.   Oropharynx clear; Dry MM   Eyes: Conjunctivae and EOM are normal. Pupils are equal, round, and reactive to light.   Neck: Normal range of motion. Neck supple. No tracheal deviation present.   Cardiovascular: Normal rate, regular rhythm, normal heart sounds and intact distal pulses.   2+ radial pulses to B/L UE   Pulmonary/Chest: No respiratory distress. He has wheezes (Scattered, mild, expiratory wheezes). He has no rhonchi. He has no rales.   Musculoskeletal: Normal range of motion. He exhibits no edema or tenderness.   Neurological: He is alert and oriented to person, place, and time. He has normal strength. No cranial nerve deficit or sensory deficit.   Skin: Skin is warm and dry. Capillary refill takes less than 2 seconds.         ED Course   Procedures  Labs Reviewed - No data to display  EKG Readings: (Independently Interpreted)   Initial Reading: No STEMI. Previous EKG: Compared with most recent EKG Previous EKG Date: 5/22/08 (Minimal change). Rhythm: Normal Sinus Rhythm. Heart Rate: 76. Ectopy: No Ectopy. Conduction: LVH with repolarization abnormality. ST Segments: Normal ST  Segments. T Waves Flipped: I, II, AVR, AVF, V1, V4, V5 and V6. Axis: Normal.         X-Rays:   Independently Interpreted Readings:   Other Readings:  Imaging interpreted by radiologist and visualized by me    Imaging Results           CTA Chest Non-Coronary (PE Study) (Final result)  Result time 06/20/19 02:18:35    Final result by Aaron Tan MD (06/20/19 02:18:35)                 Impression:      There is no large central saddle type pulmonary embolus, there is no additional evidence for pulmonary embolus on this exam.    Left upper lobe pulmonary nodule measuring up to 6.3 mm in size, as discussed above, follow-up is recommended, For a solid nodule 6-8 mm, Fleischner Society 2017 guidelines recommend follow up with non-contrast chest CT at 6-12 months and 18-24 months after discovery.    Prominent pretracheal lymph node can be re-evaluated on follow-up as well.    Mild patchy infiltrate of the left lower lobe.    This report was flagged in Epic as abnormal.      Electronically signed by: Aaron Tan  Date:    06/20/2019  Time:    02:18             Narrative:    EXAMINATION:  CTA CHEST NON CORONARY    CLINICAL HISTORY:  Chest pain, acute, PE suspected, intermed prob, positive D-dimer;    TECHNIQUE:  Low dose axial images, sagittal and coronal reformations were obtained from the thoracic inlet to the lung bases following the IV administration of 100 mL of Omnipaque 350.  Contrast timing was optimized to evaluate the pulmonary arteries.  MIP images were performed.    COMPARISON:  None    FINDINGS:  CT examination of the chest was performed via pulmonary embolus protocol axial imaging is submitted performed via pulmonary arterial angiographic protocol.  Sagittal and coronal reconstruction imaging is submitted, axial MIP reconstruction imaging is submitted.  Intravenous contrast was utilized.  There is no prior examination available for comparison.    There is no large central saddle type pulmonary embolus,  when accounting for artifact the pulmonary arterial vascular structures demonstrate opacification without abnormal pattern of pulmonary arterial filling defects specific for pulmonary emboli.    The thoracic aorta demonstrates minimal opacification as the examination was optimized for evaluation of the pulmonary arterial vascular.  The thoracic aorta appears normal in caliber with mild atherosclerotic change noted.  There are small mediastinal lymph nodes noted, there appears to be a prominent pretracheal lymph node noted axial image 91 measuring up to approximately 1.9 by 1.7 cm in size.  Follow-up is recommended.  There is no pericardial effusion, there is no pleural effusion.  There is no pneumothorax.  Chronic lung changes are noted with emphysematous change, bleb/bulla formation, there is also mild motion artifact and atelectatic change.  There is no large area of confluent infiltrate or consolidation.  There is mild subtle patchy left lower lobe infiltrate noted.  As seen on axial image 108 there is a left upper lobe pulmonary nodule noted peripherally measuring approximately 6 x 6.3 mm.  Follow-up is recommended.  Limited imaging of the upper abdomen demonstrates no evidence for acute upper abdominal process.  The visualized osseous structures demonstrate chronic change.                               X-Ray Chest PA And Lateral (Final result)  Result time 06/20/19 00:14:25    Final result by Gildardo Franks MD (06/20/19 00:14:25)                 Impression:      Mild interstitial prominence which may reflect chronic change versus mild interstitial edema.      Electronically signed by: Gildardo Franks MD  Date:    06/20/2019  Time:    00:14             Narrative:    EXAMINATION:  XR CHEST PA AND LATERAL    CLINICAL HISTORY:  Cough    TECHNIQUE:  PA and lateral views of the chest were performed.    COMPARISON:  May 2016.    FINDINGS:  Cardiac silhouette is upper limits of normal in size.  Lungs are  symmetrically expanded.  There is mild nonspecific interstitial prominence which could reflect chronic change versus mild interstitial edema.  No evidence of focal consolidative process, pneumothorax, or significant effusion.  No acute osseous abnormality identified.                                Medical Decision Making:   Initial Assessment:   58-year-old male presents emergency department complaining of neck pain radiating to his left upper extremity after moving some heavy furniture several days ago  Differential Diagnosis:   Radiculopathy, spasm, strain, sprain  Independently Interpreted Test(s):   I have ordered and independently interpreted X-rays - see prior notes.  I have ordered and independently interpreted EKG Reading(s) - see prior notes  Clinical Tests:   Lab Tests: Reviewed       <> Summary of Lab: Benign  ED Management:  Px given toradol and compazine IM for his pain, reports almost total resolution of symptoms. Given a duoneb for his cough/wheezing, which he reports has also improved. VSS, no more wheezing noted on reeaval. No increased WOBing noted on initial or reeval. Discussed disposition including discharge with rx for flexeril and albuterol, instructions to f/u promptly with PCP, home mgmt techniques, strict return precautions. Px expressed good understanding and is comfortable with discharge at this time.     Just before the patient was to be discharged, I was notified of a low oxygen saturation.  This was confirmed by an ABG.  We proceeded with blood work and imaging studies.  His troponin and BNP are both negative.  His CTA is negative for pulmonary embolism, does show some emphysematous changes as well as a small left lower lobe developing infiltrate.  Patient was given some azithromycin here and informed of results.  I also informed him that, given his oxygen saturation, he qualifies for admission to the hospital, which the patient immediately declines, states he is feeling much better  and wants to go home.  I have sent in a referral to the [facility] internal medicine team and provided the patient with additional prescriptions including prednisone and azithromycin, instructed him on the use of the albuterol pump, and instructed him to return immediately with any new or worsening symptoms, particularly fever, chest pain, or shortness of breath. Patient expressed good understanding and reiterates his decision to leave and go home at this time.                      Clinical Impression:       ICD-10-CM ICD-9-CM   1. Cervical radiculopathy M54.12 723.4   2. Left arm pain M79.602 729.5   3. Bronchitis J40 490   4. Cough R05 786.2   5. Chronic obstructive pulmonary disease with acute lower respiratory infection J44.0 496     519.8   6. Pneumonia of left lower lobe due to infectious organism J18.1 486         Disposition:   Disposition: Discharged  Condition: Stable                        Kike Nowak MD  06/19/19 5961       Kike Nowak MD  06/20/19 0254

## 2019-06-20 NOTE — DISCHARGE INSTRUCTIONS
In the morning after breakfast I recommend you begin taking ibuprofen 600mg every 8 hours with food as needed for pain. I also recommend looking up stretching exercises for cervical radiculopathy on youtube, as these can help you feel better faster. Please make sure you follow up with a primary care physician for further evaluation.

## 2019-06-20 NOTE — ED NOTES
PT updated on POC and v/u. Cardiac monitor and continuous pulse ox in place. Call light within reach. Pt denies pain at this tiem

## 2019-07-04 ENCOUNTER — HOSPITAL ENCOUNTER (EMERGENCY)
Facility: HOSPITAL | Age: 59
Discharge: HOME | End: 2019-07-05
Attending: EMERGENCY MEDICINE
Payer: MEDICAID

## 2019-07-04 DIAGNOSIS — S39.012D LUMBAR STRAIN, SUBSEQUENT ENCOUNTER: Primary | ICD-10-CM

## 2019-07-04 PROCEDURE — 96372 THER/PROPH/DIAG INJ SC/IM: CPT

## 2019-07-04 PROCEDURE — 25000003 PHARM REV CODE 250: Performed by: EMERGENCY MEDICINE

## 2019-07-04 PROCEDURE — 63600175 PHARM REV CODE 636 W HCPCS: Performed by: EMERGENCY MEDICINE

## 2019-07-04 PROCEDURE — 99284 EMERGENCY DEPT VISIT MOD MDM: CPT | Mod: 25

## 2019-07-04 RX ORDER — KETOROLAC TROMETHAMINE 30 MG/ML
30 INJECTION, SOLUTION INTRAMUSCULAR; INTRAVENOUS
Status: COMPLETED | OUTPATIENT
Start: 2019-07-04 | End: 2019-07-04

## 2019-07-04 RX ORDER — ETODOLAC 500 MG/1
500 TABLET, EXTENDED RELEASE ORAL DAILY
COMMUNITY
End: 2022-06-27

## 2019-07-04 RX ORDER — CYCLOBENZAPRINE HCL 5 MG
5 TABLET ORAL 3 TIMES DAILY PRN
COMMUNITY
End: 2019-07-05 | Stop reason: SDUPTHER

## 2019-07-04 RX ORDER — METHOCARBAMOL 750 MG/1
1500 TABLET, FILM COATED ORAL
Status: COMPLETED | OUTPATIENT
Start: 2019-07-04 | End: 2019-07-04

## 2019-07-04 RX ADMIN — METHOCARBAMOL TABLETS 1500 MG: 750 TABLET, COATED ORAL at 11:07

## 2019-07-04 RX ADMIN — KETOROLAC TROMETHAMINE 30 MG: 30 INJECTION, SOLUTION INTRAMUSCULAR at 11:07

## 2019-07-05 VITALS
DIASTOLIC BLOOD PRESSURE: 80 MMHG | BODY MASS INDEX: 49.44 KG/M2 | SYSTOLIC BLOOD PRESSURE: 164 MMHG | HEIGHT: 67 IN | HEART RATE: 76 BPM | WEIGHT: 315 LBS | RESPIRATION RATE: 18 BRPM | OXYGEN SATURATION: 99 % | TEMPERATURE: 98 F

## 2019-07-05 PROCEDURE — 63600175 PHARM REV CODE 636 W HCPCS: Performed by: EMERGENCY MEDICINE

## 2019-07-05 RX ORDER — CYCLOBENZAPRINE HCL 5 MG
10 TABLET ORAL 3 TIMES DAILY PRN
Qty: 9 TABLET | Refills: 0 | Status: SHIPPED | OUTPATIENT
Start: 2019-07-05 | End: 2022-06-27

## 2019-07-05 RX ORDER — MORPHINE SULFATE 2 MG/ML
6 INJECTION, SOLUTION INTRAMUSCULAR; INTRAVENOUS
Status: COMPLETED | OUTPATIENT
Start: 2019-07-05 | End: 2019-07-05

## 2019-07-05 RX ADMIN — MORPHINE SULFATE 6 MG: 2 INJECTION, SOLUTION INTRAMUSCULAR; INTRAVENOUS at 12:07

## 2019-07-05 NOTE — ED PROVIDER NOTES
"Encounter Date: 7/4/2019    SCRIBE #1 NOTE: I, Ivy Landers, am scribing for, and in the presence of,  Dr. Crespo. I have scribed the entire note.       History     Chief Complaint   Patient presents with    Back Pain     Pt. c/o chronic lower back pain for 2 weeks with burning down the left leg. Denies trauma or urinary difficulties.      A 59 y/o male with no significant PMHx presents to the ED for atraumatic back pain x 2 weeks.  States that he was seen recently for similar complaint and given anti-inflammatories and muscle relaxers.  Medications helped but now he is out of both of them.  He denies any acute pain or change since last ED evaluation. He describes lower back pain as "burning" that radiates to LLE. Denies fever, nausea, loss of bowel/bladder control, numbness/tingling, difficulty ambulating, dysuria, hematuria, flank pain, or any additional complaints at this time.     The history is provided by the patient.     Review of patient's allergies indicates:  No Known Allergies  History reviewed. No pertinent past medical history.  History reviewed. No pertinent surgical history.  History reviewed. No pertinent family history.  Social History     Tobacco Use    Smoking status: Current Every Day Smoker     Types: Vaping with nicotine    Smokeless tobacco: Current User   Substance Use Topics    Alcohol use: Yes     Alcohol/week: 3.6 oz     Types: 6 Cans of beer per week     Comment: daily    Drug use: Yes     Types: Marijuana     Review of Systems   Constitutional: Negative for chills and fever.   HENT: Negative for congestion, ear pain, rhinorrhea and sore throat.    Respiratory: Negative for cough and shortness of breath.    Cardiovascular: Negative for chest pain.   Gastrointestinal: Negative for abdominal pain, diarrhea, nausea and vomiting.   Genitourinary: Negative for dysuria and hematuria.   Musculoskeletal: Positive for back pain. Negative for myalgias and neck pain.   Skin: Negative for " rash.   Neurological: Negative for dizziness, weakness, light-headedness and headaches.   Psychiatric/Behavioral: Negative for confusion.       Physical Exam     Initial Vitals [07/04/19 2144]   BP Pulse Resp Temp SpO2   (!) 182/86 73 18 97.8 °F (36.6 °C) 96 %      MAP       --         Physical Exam    Nursing note and vitals reviewed.  Constitutional: He appears well-developed and well-nourished. He is not diaphoretic. No distress.   HENT:   Head: Normocephalic and atraumatic.   Right Ear: External ear normal.   Left Ear: External ear normal.   Nose: Nose normal.   Eyes: Conjunctivae are normal.   Cardiovascular: Normal rate, regular rhythm and normal heart sounds. Exam reveals no friction rub.    No murmur heard.  Pulmonary/Chest: No respiratory distress. He has no wheezes. He has no rhonchi.   Abdominal: Soft. He exhibits no distension. There is no tenderness. There is no rebound.   obese   Musculoskeletal: Normal range of motion. He exhibits no tenderness.   Left sided lumbar paraspinous muscle TTP  No midline bony tenderness  Normal alignment of thoracic and lumbar spine   Neurological: He is alert and oriented to person, place, and time. He has normal strength. No cranial nerve deficit or sensory deficit. GCS score is 15. GCS eye subscore is 4. GCS verbal subscore is 5. GCS motor subscore is 6.   Symmetric strength in lower extremities bilaterally. Sensation intact in lower extremities bilaterally.   Skin: Skin is warm and dry. Capillary refill takes less than 2 seconds.   Psychiatric: He has a normal mood and affect.         ED Course   Procedures  Labs Reviewed - No data to display       X-Rays:   Independently Interpreted Readings:   Other Readings:  Reviewed by myself, read by radiology.      Imaging Results          X-Ray Lumbar Spine Ap And Lateral (Final result)  Result time 07/04/19 23:51:24    Final result by Gildardo Franks MD (07/04/19 23:51:24)                 Impression:      Lower lumbar DJD.   No acute lumbar spine abnormalities identified.      Electronically signed by: Gildardo Franks MD  Date:    07/04/2019  Time:    23:51             Narrative:    EXAMINATION:  XR LUMBAR SPINE AP AND LATERAL    CLINICAL HISTORY:  Low back pain, prior surgery, new or progressive sx;    TECHNIQUE:  AP, lateral and spot images were performed of the lumbar spine.    COMPARISON:  None    FINDINGS:  Lumbar spine alignment is within normal limits.  No evidence of acute lumbar spine fracture or subluxation.  Degenerative changes are seen most prominently at the L4-5 and L5-S1 levels.  Visualized sacrum is unremarkable.                               Medical Decision Making:   Initial Assessment:   A 59 y/o male presents to the ED for back pain x 2 weeks.  Differential Diagnosis:   Muscular pain, herniated disc, spine fracture, intra-abdominal causes and urinary tract infection, dehydration.     Clinical Tests:   Radiological Study: Ordered and Reviewed  ED Management:  Upon re-evaluation, the patient's status has improved.  After complete ED evaluation, clinical impression is most consistent with lumbar strain.  Lumbar XR reveals degenerative changes  PCP follow-up within 2-3 days was recommended.    After taking into careful account the patient's history, physical exam findings, as well as empirical and objective data obtained throughout ED workup, I feel no emergent medical condition has been identified. No further evaluation or admission was felt to be required, and the patient is stable for discharge from the ED. The patient and any additional family present were updated with test results, overall clinical impression, and recommended further plan of care, including discharge instructions as provided and outpatient follow-up for continued evaluation and management as needed. All questions were answered. The patient expressed understanding and agreed with current plan for discharge and follow-up plan of care. Strict ED return  precautions were provided, including return/worsening of current symptoms, new symptoms, or any other concerns.                     ED Course as of Jul 05 0015   Thu Jul 04, 2019 2231 BP(!): 182/86 [LD]   2231 Temp: 97.8 °F (36.6 °C) [LD]   2231 Pulse: 73 [LD]   2231 Resp: 18 [LD]   2231 SpO2: 96 % [LD]   Fri Jul 05, 2019   0007 Patient lying in hospital bed sleeping.  Appears comfortable.  Arousable.  States that pain has improved.    [LD]      ED Course User Index  [LD] Sandie Crespo MD     Clinical Impression:       ICD-10-CM ICD-9-CM   1. Lumbar strain, subsequent encounter S39.012D V58.89     847.2       Disposition:   Disposition: Discharged  Condition: Stable       I, Sandie Crespo,  personally performed the services described in this documentation. All medical record entries made by the scribe were at my direction and in my presence.  I have reviewed the chart and agree that the record reflects my personal performance and is accurate and complete. Sandie Crespo M.D. 12:15 AM07/05/2019                 Sandie Crespo MD  07/05/19 0015

## 2021-05-13 ENCOUNTER — LAB VISIT (OUTPATIENT)
Dept: LAB | Facility: HOSPITAL | Age: 61
End: 2021-05-13
Attending: EMERGENCY MEDICINE
Payer: MEDICAID

## 2021-05-13 DIAGNOSIS — Z13.1 SCREENING FOR DIABETES MELLITUS: ICD-10-CM

## 2021-05-13 DIAGNOSIS — Z13.220 SCREENING FOR LIPOID DISORDERS: ICD-10-CM

## 2021-05-13 DIAGNOSIS — Z13.6 SCREENING FOR ISCHEMIC HEART DISEASE: ICD-10-CM

## 2021-05-13 DIAGNOSIS — M13.0 POLYARTHROPATHY: ICD-10-CM

## 2021-05-13 DIAGNOSIS — Z72.51 PROBLEMS RELATED TO HIGH-RISK SEXUAL BEHAVIOR: ICD-10-CM

## 2021-05-13 DIAGNOSIS — Z91.81 PERSONAL HISTORY OF FALL: ICD-10-CM

## 2021-05-13 DIAGNOSIS — E66.9 OBESITY, UNSPECIFIED: Primary | ICD-10-CM

## 2021-05-13 LAB
ALBUMIN SERPL BCP-MCNC: 3.4 G/DL (ref 3.5–5.2)
ALP SERPL-CCNC: 65 U/L (ref 55–135)
ALT SERPL W/O P-5'-P-CCNC: 14 U/L (ref 10–44)
ANION GAP SERPL CALC-SCNC: 9 MMOL/L (ref 8–16)
AST SERPL-CCNC: 15 U/L (ref 10–40)
BASOPHILS # BLD AUTO: 0.02 K/UL (ref 0–0.2)
BASOPHILS NFR BLD: 0.3 % (ref 0–1.9)
BILIRUB SERPL-MCNC: 0.6 MG/DL (ref 0.1–1)
BUN SERPL-MCNC: 10 MG/DL (ref 6–20)
CALCIUM SERPL-MCNC: 8.9 MG/DL (ref 8.7–10.5)
CHLORIDE SERPL-SCNC: 104 MMOL/L (ref 95–110)
CHOLEST SERPL-MCNC: 178 MG/DL (ref 120–199)
CHOLEST/HDLC SERPL: 3 {RATIO} (ref 2–5)
CO2 SERPL-SCNC: 28 MMOL/L (ref 23–29)
CREAT SERPL-MCNC: 1.1 MG/DL (ref 0.5–1.4)
DIFFERENTIAL METHOD: ABNORMAL
EOSINOPHIL # BLD AUTO: 0.1 K/UL (ref 0–0.5)
EOSINOPHIL NFR BLD: 1.7 % (ref 0–8)
ERYTHROCYTE [DISTWIDTH] IN BLOOD BY AUTOMATED COUNT: 13.5 % (ref 11.5–14.5)
EST. GFR  (AFRICAN AMERICAN): >60 ML/MIN/1.73 M^2
EST. GFR  (NON AFRICAN AMERICAN): >60 ML/MIN/1.73 M^2
ESTIMATED AVG GLUCOSE: 103 MG/DL (ref 68–131)
GLUCOSE SERPL-MCNC: 97 MG/DL (ref 70–110)
HBA1C MFR BLD: 5.2 % (ref 4–5.6)
HCT VFR BLD AUTO: 50.3 % (ref 40–54)
HDLC SERPL-MCNC: 60 MG/DL (ref 40–75)
HDLC SERPL: 33.7 % (ref 20–50)
HGB BLD-MCNC: 16.8 G/DL (ref 14–18)
IMM GRANULOCYTES # BLD AUTO: 0.02 K/UL (ref 0–0.04)
IMM GRANULOCYTES NFR BLD AUTO: 0.3 % (ref 0–0.5)
LDLC SERPL CALC-MCNC: 106.8 MG/DL (ref 63–159)
LYMPHOCYTES # BLD AUTO: 1.3 K/UL (ref 1–4.8)
LYMPHOCYTES NFR BLD: 23 % (ref 18–48)
MCH RBC QN AUTO: 32.8 PG (ref 27–31)
MCHC RBC AUTO-ENTMCNC: 33.4 G/DL (ref 32–36)
MCV RBC AUTO: 98 FL (ref 82–98)
MONOCYTES # BLD AUTO: 0.5 K/UL (ref 0.3–1)
MONOCYTES NFR BLD: 8.2 % (ref 4–15)
NEUTROPHILS # BLD AUTO: 3.8 K/UL (ref 1.8–7.7)
NEUTROPHILS NFR BLD: 66.5 % (ref 38–73)
NONHDLC SERPL-MCNC: 118 MG/DL
NRBC BLD-RTO: 0 /100 WBC
PLATELET # BLD AUTO: 180 K/UL (ref 150–450)
PMV BLD AUTO: 8.7 FL (ref 9.2–12.9)
POTASSIUM SERPL-SCNC: 3.9 MMOL/L (ref 3.5–5.1)
PROT SERPL-MCNC: 7.3 G/DL (ref 6–8.4)
RBC # BLD AUTO: 5.12 M/UL (ref 4.6–6.2)
SODIUM SERPL-SCNC: 141 MMOL/L (ref 136–145)
TRIGL SERPL-MCNC: 56 MG/DL (ref 30–150)
TSH SERPL DL<=0.005 MIU/L-ACNC: 1.91 UIU/ML (ref 0.4–4)
WBC # BLD AUTO: 5.74 K/UL (ref 3.9–12.7)

## 2021-05-13 PROCEDURE — 86803 HEPATITIS C AB TEST: CPT | Performed by: EMERGENCY MEDICINE

## 2021-05-13 PROCEDURE — 86695 HERPES SIMPLEX TYPE 1 TEST: CPT | Performed by: EMERGENCY MEDICINE

## 2021-05-13 PROCEDURE — 84443 ASSAY THYROID STIM HORMONE: CPT | Performed by: EMERGENCY MEDICINE

## 2021-05-13 PROCEDURE — 83036 HEMOGLOBIN GLYCOSYLATED A1C: CPT | Performed by: EMERGENCY MEDICINE

## 2021-05-13 PROCEDURE — 80053 COMPREHEN METABOLIC PANEL: CPT | Performed by: EMERGENCY MEDICINE

## 2021-05-13 PROCEDURE — 86703 HIV-1/HIV-2 1 RESULT ANTBDY: CPT | Performed by: EMERGENCY MEDICINE

## 2021-05-13 PROCEDURE — 86592 SYPHILIS TEST NON-TREP QUAL: CPT | Performed by: EMERGENCY MEDICINE

## 2021-05-13 PROCEDURE — 80061 LIPID PANEL: CPT | Performed by: EMERGENCY MEDICINE

## 2021-05-13 PROCEDURE — 85025 COMPLETE CBC W/AUTO DIFF WBC: CPT | Performed by: EMERGENCY MEDICINE

## 2021-05-13 PROCEDURE — 36415 COLL VENOUS BLD VENIPUNCTURE: CPT | Performed by: EMERGENCY MEDICINE

## 2021-05-13 PROCEDURE — 86696 HERPES SIMPLEX TYPE 2 TEST: CPT | Performed by: EMERGENCY MEDICINE

## 2021-05-14 LAB
HCV AB SERPL QL IA: NEGATIVE
HIV 1+2 AB+HIV1 P24 AG SERPL QL IA: NEGATIVE
RPR SER QL: NORMAL

## 2021-05-15 LAB
HSV1 IGG SERPL QL IA: NEGATIVE
HSV2 IGG SERPL QL IA: POSITIVE

## 2022-06-20 ENCOUNTER — HOSPITAL ENCOUNTER (EMERGENCY)
Facility: HOSPITAL | Age: 62
Discharge: HOME | End: 2022-06-20
Attending: EMERGENCY MEDICINE
Payer: MEDICAID

## 2022-06-20 VITALS
SYSTOLIC BLOOD PRESSURE: 175 MMHG | RESPIRATION RATE: 18 BRPM | HEIGHT: 67 IN | OXYGEN SATURATION: 92 % | DIASTOLIC BLOOD PRESSURE: 87 MMHG | BODY MASS INDEX: 49.44 KG/M2 | TEMPERATURE: 98 F | WEIGHT: 315 LBS | HEART RATE: 71 BPM

## 2022-06-20 DIAGNOSIS — M54.32 LEFT SIDED SCIATICA: ICD-10-CM

## 2022-06-20 DIAGNOSIS — M43.17 ANTEROLISTHESIS OF LUMBOSACRAL SPINE: Primary | ICD-10-CM

## 2022-06-20 DIAGNOSIS — S39.012A STRAIN OF LUMBAR REGION, INITIAL ENCOUNTER: ICD-10-CM

## 2022-06-20 PROCEDURE — 63600175 PHARM REV CODE 636 W HCPCS: Performed by: EMERGENCY MEDICINE

## 2022-06-20 PROCEDURE — 99284 EMERGENCY DEPT VISIT MOD MDM: CPT | Mod: 25

## 2022-06-20 PROCEDURE — 96372 THER/PROPH/DIAG INJ SC/IM: CPT | Performed by: EMERGENCY MEDICINE

## 2022-06-20 RX ORDER — KETOROLAC TROMETHAMINE 30 MG/ML
30 INJECTION, SOLUTION INTRAMUSCULAR; INTRAVENOUS
Status: COMPLETED | OUTPATIENT
Start: 2022-06-20 | End: 2022-06-20

## 2022-06-20 RX ORDER — METHOCARBAMOL 500 MG/1
1000 TABLET, FILM COATED ORAL 3 TIMES DAILY PRN
Qty: 30 TABLET | Refills: 0 | Status: SHIPPED | OUTPATIENT
Start: 2022-06-20 | End: 2022-06-25

## 2022-06-20 RX ORDER — KETOROLAC TROMETHAMINE 10 MG/1
10 TABLET, FILM COATED ORAL EVERY 6 HOURS PRN
Qty: 12 TABLET | Refills: 0 | Status: SHIPPED | OUTPATIENT
Start: 2022-06-20 | End: 2022-06-23

## 2022-06-20 RX ORDER — ORPHENADRINE CITRATE 30 MG/ML
30 INJECTION INTRAMUSCULAR; INTRAVENOUS
Status: COMPLETED | OUTPATIENT
Start: 2022-06-20 | End: 2022-06-20

## 2022-06-20 RX ADMIN — ORPHENADRINE CITRATE 30 MG: 30 INJECTION INTRAMUSCULAR; INTRAVENOUS at 10:06

## 2022-06-20 RX ADMIN — KETOROLAC TROMETHAMINE 30 MG: 30 INJECTION, SOLUTION INTRAMUSCULAR at 10:06

## 2022-06-20 NOTE — FIRST PROVIDER EVALUATION
Emergency Department TeleTriage Encounter Note      CHIEF COMPLAINT    Chief Complaint   Patient presents with    Back Pain     Pt c/o lower back pain x 1 week. Pt bent over and felt popping in his back. Pt has had difficulty walking since then. Pt has a slow gait.        VITAL SIGNS   Initial Vitals [06/20/22 1746]   BP Pulse Resp Temp SpO2   (!) 175/87 71 18 98.4 °F (36.9 °C) (!) 92 %      MAP       --            ALLERGIES    Review of patient's allergies indicates:  No Known Allergies    PROVIDER TRIAGE NOTE  This is a teletriage evaluation of a 61 y.o. male presenting to the ED complaining of low back pain for one week. Reports he bent forward and heard a pop and has been having pain since.    Denies abd pain, weakness, numbness/tingling.  No urinary symptoms.      Initial orders will be placed and care will be transferred to an alternate provider when patient is roomed for a full evaluation. Any additional orders and the final disposition will be determined by that provider.           ORDERS  Labs Reviewed - No data to display    ED Orders (720h ago, onward)    Start Ordered     Status Ordering Provider    06/20/22 1754 06/20/22 1754  X-Ray Lumbar Spine Ap And Lateral  1 time imaging         Ordered NIKO MCGOVERN            Virtual Visit Note: The provider triage portion of this emergency department evaluation and documentation was performed via [employer], a HIPAA-compliant telemedicine application, in concert with a tele-presenter in the room. A face to face patient evaluation with one of my colleagues will occur once the patient is placed in an emergency department room.      DISCLAIMER: This note was prepared with [employer] voice recognition transcription software. Garbled syntax, mangled pronouns, and other bizarre constructions may be attributed to that software system.

## 2022-06-21 NOTE — ED PROVIDER NOTES
"Encounter Date: 6/20/2022       History     Chief Complaint   Patient presents with    Back Pain     Pt c/o lower back pain x 1 week. Pt bent over and felt popping in his back. Pt has had difficulty walking since then. Pt has a slow gait.      Patient is a 61-year-old male who presents to the ED with complaint of lower back pain.  Patient states that approximately 1 week ago he bent over to  something and he reportedly "felt a pop" in his lower back.  He now reports pain to the left lower lumbar region that exacerbated with movement and improved with rest.  He describes as stabbing pain with occasional radiation to the posterior left lower extremity.  He denies any so-called red flags OCD with back pain such as bowel/bladder incontinence or retention, fever, or saddle anesthesia.  He denies any neurological deficits, weakness or tingling.  He denies any  complaints.  The patient states the pain has somewhat improved since the initial presentation and that he has not taken anything for his symptoms. Additionally, he denies any additional trauma, night sweats or unintentional weight loss when explicitly questioned.         Review of patient's allergies indicates:  No Known Allergies  No past medical history on file.  No past surgical history on file.  No family history on file.  Social History     Tobacco Use    Smoking status: Current Every Day Smoker     Types: Vaping with nicotine    Smokeless tobacco: Current User   Substance Use Topics    Alcohol use: Yes     Alcohol/week: 6.0 standard drinks     Types: 6 Cans of beer per week     Comment: daily    Drug use: Yes     Types: Marijuana     Review of Systems   Constitutional: Negative for chills and fever.   Respiratory: Negative for chest tightness and shortness of breath.    Cardiovascular: Negative for chest pain, palpitations and leg swelling.   Gastrointestinal: Negative for abdominal pain, nausea and vomiting.   Genitourinary: Negative for " dysuria, flank pain, frequency and hematuria.   Musculoskeletal: Positive for back pain. Negative for arthralgias, gait problem, joint swelling, myalgias and neck pain.   Neurological: Negative for dizziness, tremors, syncope, light-headedness, numbness and headaches.   Psychiatric/Behavioral: Negative for agitation and confusion.       Physical Exam     Initial Vitals [06/20/22 1746]   BP Pulse Resp Temp SpO2   (!) 175/87 71 18 98.4 °F (36.9 °C) (!) 92 %      MAP       --         Physical Exam    Constitutional: He appears well-developed and well-nourished.   Obese  No acute distress  Non toxic    HENT:   Head: Normocephalic.   Right Ear: External ear normal.   Left Ear: External ear normal.   Eyes: Conjunctivae and EOM are normal. Pupils are equal, round, and reactive to light.   Neck: Neck supple.   Normal range of motion.  Pulmonary/Chest: Breath sounds normal.   Abdominal: Abdomen is soft. Bowel sounds are normal.   Musculoskeletal:         General: No tenderness or edema.      Cervical back: Normal range of motion and neck supple.      Lumbar back: No bony tenderness. Normal range of motion. Positive left straight leg raise test. No scoliosis.        Back:       Comments: Mild paraspinal muscle tenderness on palpation per patient; no skin changes; no scoliosis; no bony tenderness; +SLR on the L; negative STR on the left      Neurological: He is alert and oriented to person, place, and time. He has normal strength. GCS score is 15. GCS eye subscore is 4. GCS verbal subscore is 5. GCS motor subscore is 6.   No focal neurological deficits  Strength 5/5   Sensation grossly in tact  MAEW  GCS 15  AAOx3     Skin: Skin is warm.         ED Course   Procedures  Labs Reviewed - No data to display       Imaging Results          X-Ray Lumbar Spine Ap And Lateral (Final result)  Result time 06/20/22 18:54:50    Final result by Gregory Ireland DO (06/20/22 18:54:50)                 Impression:      No acute fracture or  subluxation of the lumbar spine.    Continued grade 1 anterolisthesis of L5 on S1.      Electronically signed by: Gregory Ireland  Date:    06/20/2022  Time:    18:54             Narrative:    EXAMINATION:  XR LUMBAR SPINE AP AND LATERAL    CLINICAL HISTORY:  low back pain;    TECHNIQUE:  AP and lateral images were performed of the lumbar spine.    COMPARISON:  07/04/2019.    FINDINGS:  There are 5 non-rib-bearing lumbar spine vertebrae.  There is no evidence of acute fracture or subluxation of the lumbar spine.  The vertebral body heights are maintained.    There is mild disc height loss at L5-S1.  Remaining disc heights are maintained.    There is grade 1 anterolisthesis of L5 on S1.  Alignment is otherwise normal.    Remaining visualized osseous structures are grossly intact.    There are atherosclerotic calcifications of the abdominal aorta.                                 Medications   ketorolac injection 30 mg (30 mg Intramuscular Given 6/20/22 2203)   orphenadrine injection 30 mg (30 mg Intramuscular Given 6/20/22 2203)     Medical Decision Making:   Clinical Tests:   Radiological Study: Reviewed  ED Management:  - a plain radiograph of the lumbar spine was ordered by the teletriage provider  - plain radiograph of the lumbar spine demonstrates mild disc height loss at L5-S1 and grade 1 anterolisthesis of L5 on S1; no osseous abnormalityis appreciated per my interpretation  - very low suspicion for cauda equina, conus medullaris or spinal cord pathology  - pt administered IM ketorolac and Norflex  - will discharge to home with rx for ketorolac and methocarbamol   - will place ambulatory referral to [facility]  - pt stable for discharge  - No further intervention is indicated at this time after having taken into account the patient's history, physical exam findings, and empirical and objective data obtained during the patient's emergency department workup.   - The patient is at low risk for an emergent  medical condition at this time, and I am of the belief that that it is safe to discharge the patient from the emergency department.   - The patient is instructed to follow up as outpatient as indicated on the discharge paperwork.    - I have discussed the specifics of the workup with the patient and the patient has verbalized understanding of the details of the workup, the diagnosis, the treatment plan, and the need for outpatient follow-up.    - Although the patient has no emergent etiology today this does not preclude the development of an emergent condition so, in addition, I have advised the patient that they can return to the ED and/or activate EMS at any time with worsening of their symptoms, change of their symptoms, or with any other medical complaint.    - The patient remained comfortable and stable during their visit in the ED.    - Discharge and follow-up instructions discussed with the patient who expressed understanding and willingness to comply with my recommendations.  - Results of all emergency department tests  discussed thoroughly with patient; all patient questions answered; pt in agreement with plan  - Pt instructed to follow up with PCP in 2-3 days for recheck of today's complaints  - Pt given strict emergency department return precautions for any new or worsening of symptoms  - Pt discharged from the emergency department in stable condition, in no acute distress                        Clinical Impression:   Final diagnoses:  [M43.17] Anterolisthesis of lumbosacral spine (Primary)  [S39.012A] Strain of lumbar region, initial encounter  [M54.32] Left sided sciatica          ED Disposition Condition    Discharge Stable        ED Prescriptions     Medication Sig Dispense Start Date End Date Auth. Provider    ketorolac (TORADOL) 10 mg tablet Take 1 tablet (10 mg total) by mouth every 6 (six) hours as needed for Pain. 12 tablet 6/20/2022 6/23/2022 Jeremias Licona MD    methocarbamoL (ROBAXIN) 500  MG Tab Take 2 tablets (1,000 mg total) by mouth 3 (three) times daily as needed (muscle pain). 30 tablet 6/20/2022 6/25/2022 Jeremias Licona MD        Follow-up Information     Follow up With Specialties Details Why Contact Info Additional Information    [facility] Schedule an appointment as soon as possible for a visit   [address], [address]  [facility] [address] [phone] Please park in [address] or [address] and use [address]. Take Medical Office Bldg. elevators.           Jeremias Licona MD  06/20/22 0412

## 2022-06-27 ENCOUNTER — HOSPITAL ENCOUNTER (INPATIENT)
Facility: HOSPITAL | Age: 62
LOS: 3 days | Discharge: HOME | DRG: 189 | End: 2022-06-30
Attending: EMERGENCY MEDICINE | Admitting: INTERNAL MEDICINE
Payer: MEDICAID

## 2022-06-27 DIAGNOSIS — S22.31XA CLOSED FRACTURE OF ONE RIB OF RIGHT SIDE, INITIAL ENCOUNTER: ICD-10-CM

## 2022-06-27 DIAGNOSIS — R07.9 CHEST PAIN: ICD-10-CM

## 2022-06-27 DIAGNOSIS — J96.01 ACUTE RESPIRATORY FAILURE WITH HYPOXIA: ICD-10-CM

## 2022-06-27 DIAGNOSIS — E66.01 SEVERE OBESITY (BMI 35.0-35.9 WITH COMORBIDITY): ICD-10-CM
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
## 2022-06-27 DIAGNOSIS — J96.02 ACUTE RESPIRATORY FAILURE WITH HYPOXIA AND HYPERCARBIA: Primary | ICD-10-CM

## 2022-06-27 DIAGNOSIS — J44.1 ACUTE EXACERBATION OF CHRONIC OBSTRUCTIVE PULMONARY DISEASE (COPD): ICD-10-CM

## 2022-06-27 DIAGNOSIS — R55 SYNCOPE: ICD-10-CM

## 2022-06-27 DIAGNOSIS — J96.01 ACUTE RESPIRATORY FAILURE WITH HYPOXIA AND HYPERCARBIA: Primary | ICD-10-CM

## 2022-06-27 DIAGNOSIS — M79.89 LEG SWELLING: ICD-10-CM

## 2022-06-27 PROBLEM — Z91.81 HISTORY OF FALL: Status: ACTIVE | Noted: 2022-06-27

## 2022-06-27 PROBLEM — S22.39XA RIB FRACTURE: Status: ACTIVE | Noted: 2022-06-27

## 2022-06-27 PROBLEM — E78.00 HIGH CHOLESTEROL: Status: ACTIVE | Noted: 2022-06-27

## 2022-06-27 PROBLEM — I10 HYPERTENSION: Status: ACTIVE | Noted: 2022-06-27

## 2022-06-27 LAB
ALBUMIN SERPL BCP-MCNC: 3.3 G/DL (ref 3.5–5.2)
ALP SERPL-CCNC: 65 U/L (ref 55–135)
ALT SERPL W/O P-5'-P-CCNC: 14 U/L (ref 10–44)
AMPHET+METHAMPHET UR QL: NEGATIVE
ANION GAP SERPL CALC-SCNC: 8 MMOL/L (ref 8–16)
AST SERPL-CCNC: 17 U/L (ref 10–40)
BACTERIA #/AREA URNS HPF: NORMAL /HPF
BARBITURATES UR QL SCN>200 NG/ML: NEGATIVE
BASOPHILS # BLD AUTO: 0.02 K/UL (ref 0–0.2)
BASOPHILS NFR BLD: 0.3 % (ref 0–1.9)
BENZODIAZ UR QL SCN>200 NG/ML: NEGATIVE
BILIRUB SERPL-MCNC: 0.5 MG/DL (ref 0.1–1)
BILIRUB UR QL STRIP: NEGATIVE
BNP SERPL-MCNC: 28 PG/ML (ref 0–99)
BUN SERPL-MCNC: 12 MG/DL (ref 6–20)
BZE UR QL SCN: ABNORMAL
CALCIUM SERPL-MCNC: 9.8 MG/DL (ref 8.7–10.5)
CANNABINOIDS UR QL SCN: NEGATIVE
CHLORIDE SERPL-SCNC: 104 MMOL/L (ref 95–110)
CHOLEST SERPL-MCNC: 163 MG/DL (ref 120–199)
CHOLEST/HDLC SERPL: 3.2 {RATIO} (ref 2–5)
CLARITY UR: CLEAR
CO2 SERPL-SCNC: 29 MMOL/L (ref 23–29)
COLOR UR: YELLOW
CREAT SERPL-MCNC: 1 MG/DL (ref 0.5–1.4)
CREAT UR-MCNC: 355.8 MG/DL (ref 23–375)
DIFFERENTIAL METHOD: ABNORMAL
EOSINOPHIL # BLD AUTO: 0.1 K/UL (ref 0–0.5)
EOSINOPHIL NFR BLD: 1 % (ref 0–8)
ERYTHROCYTE [DISTWIDTH] IN BLOOD BY AUTOMATED COUNT: 12.8 % (ref 11.5–14.5)
EST. GFR  (AFRICAN AMERICAN): >60 ML/MIN/1.73 M^2
EST. GFR  (NON AFRICAN AMERICAN): >60 ML/MIN/1.73 M^2
GLUCOSE SERPL-MCNC: 96 MG/DL (ref 70–110)
GLUCOSE UR QL STRIP: NEGATIVE
HCO3 UR-SCNC: 37.1 MMOL/L (ref 24–28)
HCT VFR BLD AUTO: 48.9 % (ref 40–54)
HCT VFR BLD CALC: 46 %PCV (ref 36–54)
HDLC SERPL-MCNC: 51 MG/DL (ref 40–75)
HDLC SERPL: 31.3 % (ref 20–50)
HGB BLD-MCNC: 16 G/DL
HGB BLD-MCNC: 16.1 G/DL (ref 14–18)
HGB UR QL STRIP: NEGATIVE
HYALINE CASTS #/AREA URNS LPF: 1 /LPF
IMM GRANULOCYTES # BLD AUTO: 0.03 K/UL (ref 0–0.04)
IMM GRANULOCYTES NFR BLD AUTO: 0.4 % (ref 0–0.5)
KETONES UR QL STRIP: NEGATIVE
LDLC SERPL CALC-MCNC: 101.6 MG/DL (ref 63–159)
LEUKOCYTE ESTERASE UR QL STRIP: NEGATIVE
LYMPHOCYTES # BLD AUTO: 1.4 K/UL (ref 1–4.8)
LYMPHOCYTES NFR BLD: 20.3 % (ref 18–48)
MCH RBC QN AUTO: 32.9 PG (ref 27–31)
MCHC RBC AUTO-ENTMCNC: 32.9 G/DL (ref 32–36)
MCV RBC AUTO: 100 FL (ref 82–98)
METHADONE UR QL SCN>300 NG/ML: NEGATIVE
MICROSCOPIC COMMENT: NORMAL
MONOCYTES # BLD AUTO: 0.7 K/UL (ref 0.3–1)
MONOCYTES NFR BLD: 9.3 % (ref 4–15)
NEUTROPHILS # BLD AUTO: 4.8 K/UL (ref 1.8–7.7)
NEUTROPHILS NFR BLD: 68.7 % (ref 38–73)
NITRITE UR QL STRIP: NEGATIVE
NONHDLC SERPL-MCNC: 112 MG/DL
NRBC BLD-RTO: 0 /100 WBC
OPIATES UR QL SCN: NEGATIVE
PCO2 BLDA: 67.6 MMHG (ref 35–45)
PCP UR QL SCN>25 NG/ML: NEGATIVE
PH SMN: 7.35 [PH] (ref 7.35–7.45)
PH UR STRIP: 6 [PH] (ref 5–8)
PLATELET # BLD AUTO: 190 K/UL (ref 150–450)
PMV BLD AUTO: 9.5 FL (ref 9.2–12.9)
PO2 BLDA: 24 MMHG (ref 40–60)
POC BE: 11 MMOL/L
POC SATURATED O2: 37 % (ref 95–100)
POC TCO2: 39 MMOL/L (ref 24–29)
POCT GLUCOSE: 117 MG/DL (ref 70–110)
POTASSIUM BLD-SCNC: 4.8 MMOL/L (ref 3.5–5.1)
POTASSIUM SERPL-SCNC: 4 MMOL/L (ref 3.5–5.1)
PROT SERPL-MCNC: 7.6 G/DL (ref 6–8.4)
PROT UR QL STRIP: ABNORMAL
RBC # BLD AUTO: 4.9 M/UL (ref 4.6–6.2)
RBC #/AREA URNS HPF: 1 /HPF (ref 0–4)
SAMPLE: ABNORMAL
SARS-COV-2 RDRP RESP QL NAA+PROBE: NEGATIVE
SODIUM BLD-SCNC: 140 MMOL/L (ref 136–145)
SODIUM SERPL-SCNC: 141 MMOL/L (ref 136–145)
SP GR UR STRIP: >1.03 (ref 1–1.03)
SQUAMOUS #/AREA URNS HPF: 1 /HPF
TOXICOLOGY INFORMATION: ABNORMAL
TRIGL SERPL-MCNC: 52 MG/DL (ref 30–150)
TROPONIN I SERPL DL<=0.01 NG/ML-MCNC: 0.01 NG/ML (ref 0–0.03)
URN SPEC COLLECT METH UR: ABNORMAL
UROBILINOGEN UR STRIP-ACNC: ABNORMAL EU/DL
WBC # BLD AUTO: 7 K/UL (ref 3.9–12.7)
WBC #/AREA URNS HPF: 1 /HPF (ref 0–5)

## 2022-06-27 PROCEDURE — 25000003 PHARM REV CODE 250: Performed by: EMERGENCY MEDICINE

## 2022-06-27 PROCEDURE — 85025 COMPLETE CBC W/AUTO DIFF WBC: CPT | Performed by: NURSE PRACTITIONER

## 2022-06-27 PROCEDURE — 84484 ASSAY OF TROPONIN QUANT: CPT | Performed by: NURSE PRACTITIONER

## 2022-06-27 PROCEDURE — 93005 ELECTROCARDIOGRAM TRACING: CPT

## 2022-06-27 PROCEDURE — 99900035 HC TECH TIME PER 15 MIN (STAT)

## 2022-06-27 PROCEDURE — 12000002 HC ACUTE/MED SURGE SEMI-PRIVATE ROOM

## 2022-06-27 PROCEDURE — G0378 HOSPITAL OBSERVATION PER HR: HCPCS

## 2022-06-27 PROCEDURE — 80053 COMPREHEN METABOLIC PANEL: CPT | Performed by: NURSE PRACTITIONER

## 2022-06-27 PROCEDURE — 83880 ASSAY OF NATRIURETIC PEPTIDE: CPT | Performed by: NURSE PRACTITIONER

## 2022-06-27 PROCEDURE — 80307 DRUG TEST PRSMV CHEM ANLYZR: CPT | Performed by: NURSE PRACTITIONER

## 2022-06-27 PROCEDURE — 25500020 PHARM REV CODE 255: Performed by: EMERGENCY MEDICINE

## 2022-06-27 PROCEDURE — 93010 EKG 12-LEAD: ICD-10-PCS | Mod: ,,, | Performed by: INTERNAL MEDICINE

## 2022-06-27 PROCEDURE — 99285 EMERGENCY DEPT VISIT HI MDM: CPT | Mod: 25

## 2022-06-27 PROCEDURE — 82803 BLOOD GASES ANY COMBINATION: CPT

## 2022-06-27 PROCEDURE — U0002 COVID-19 LAB TEST NON-CDC: HCPCS | Performed by: NURSE PRACTITIONER

## 2022-06-27 PROCEDURE — 81000 URINALYSIS NONAUTO W/SCOPE: CPT | Mod: 59 | Performed by: NURSE PRACTITIONER

## 2022-06-27 PROCEDURE — 93010 ELECTROCARDIOGRAM REPORT: CPT | Mod: ,,, | Performed by: INTERNAL MEDICINE

## 2022-06-27 PROCEDURE — 80061 LIPID PANEL: CPT | Performed by: NURSE PRACTITIONER

## 2022-06-27 RX ORDER — TRAMADOL HYDROCHLORIDE 50 MG/1
50 TABLET ORAL EVERY 6 HOURS PRN
COMMUNITY
Start: 2022-03-21 | End: 2024-03-06 | Stop reason: CLARIF

## 2022-06-27 RX ORDER — AMOXICILLIN 250 MG
1 CAPSULE ORAL 2 TIMES DAILY PRN
Status: DISCONTINUED | OUTPATIENT
Start: 2022-06-27 | End: 2022-06-30 | Stop reason: HOSPADM

## 2022-06-27 RX ORDER — NAPROXEN SODIUM 220 MG
220 TABLET ORAL 2 TIMES DAILY PRN
COMMUNITY

## 2022-06-27 RX ORDER — ACETAMINOPHEN 325 MG/1
650 TABLET ORAL EVERY 4 HOURS PRN
Status: DISCONTINUED | OUTPATIENT
Start: 2022-06-27 | End: 2022-06-30 | Stop reason: HOSPADM

## 2022-06-27 RX ORDER — NALOXONE HCL 0.4 MG/ML
0.02 VIAL (ML) INJECTION
Status: DISCONTINUED | OUTPATIENT
Start: 2022-06-27 | End: 2022-06-30 | Stop reason: HOSPADM

## 2022-06-27 RX ORDER — TALC
6 POWDER (GRAM) TOPICAL NIGHTLY PRN
Status: DISCONTINUED | OUTPATIENT
Start: 2022-06-27 | End: 2022-06-30 | Stop reason: HOSPADM

## 2022-06-27 RX ORDER — SODIUM CHLORIDE 0.9 % (FLUSH) 0.9 %
10 SYRINGE (ML) INJECTION EVERY 12 HOURS PRN
Status: DISCONTINUED | OUTPATIENT
Start: 2022-06-27 | End: 2022-06-30 | Stop reason: HOSPADM

## 2022-06-27 RX ORDER — HYDROCODONE BITARTRATE AND ACETAMINOPHEN 5; 325 MG/1; MG/1
1 TABLET ORAL EVERY 6 HOURS PRN
Status: DISCONTINUED | OUTPATIENT
Start: 2022-06-27 | End: 2022-06-30 | Stop reason: HOSPADM

## 2022-06-27 RX ORDER — HYDROCODONE BITARTRATE AND ACETAMINOPHEN 5; 325 MG/1; MG/1
1 TABLET ORAL
Status: COMPLETED | OUTPATIENT
Start: 2022-06-27 | End: 2022-06-27

## 2022-06-27 RX ORDER — AMLODIPINE BESYLATE 5 MG/1
5 TABLET ORAL DAILY
COMMUNITY
Start: 2022-01-25

## 2022-06-27 RX ORDER — ATORVASTATIN CALCIUM 10 MG/1
10 TABLET, FILM COATED ORAL NIGHTLY
COMMUNITY
Start: 2022-03-21

## 2022-06-27 RX ORDER — ONDANSETRON 2 MG/ML
4 INJECTION INTRAMUSCULAR; INTRAVENOUS EVERY 8 HOURS PRN
Status: DISCONTINUED | OUTPATIENT
Start: 2022-06-27 | End: 2022-06-30 | Stop reason: HOSPADM

## 2022-06-27 RX ORDER — METHOCARBAMOL 500 MG/1
500 TABLET, FILM COATED ORAL 3 TIMES DAILY
COMMUNITY
End: 2024-03-06 | Stop reason: CLARIF

## 2022-06-27 RX ORDER — KETOROLAC TROMETHAMINE 10 MG/1
10 TABLET, FILM COATED ORAL EVERY 6 HOURS
COMMUNITY

## 2022-06-27 RX ORDER — TAMSULOSIN HYDROCHLORIDE 0.4 MG/1
2 CAPSULE ORAL DAILY
COMMUNITY
Start: 2022-03-21 | End: 2022-07-11 | Stop reason: SDUPTHER

## 2022-06-27 RX ORDER — LIDOCAINE 50 MG/G
1 PATCH TOPICAL
Status: DISCONTINUED | OUTPATIENT
Start: 2022-06-27 | End: 2022-06-30 | Stop reason: HOSPADM

## 2022-06-27 RX ORDER — MAG HYDROX/ALUMINUM HYD/SIMETH 200-200-20
30 SUSPENSION, ORAL (FINAL DOSE FORM) ORAL EVERY 6 HOURS PRN
Status: DISCONTINUED | OUTPATIENT
Start: 2022-06-27 | End: 2022-06-30 | Stop reason: HOSPADM

## 2022-06-27 RX ORDER — IBUPROFEN 200 MG
200 TABLET ORAL EVERY 6 HOURS PRN
COMMUNITY

## 2022-06-27 RX ADMIN — LIDOCAINE 1 PATCH: 50 PATCH CUTANEOUS at 07:06

## 2022-06-27 RX ADMIN — IOHEXOL 100 ML: 350 INJECTION, SOLUTION INTRAVENOUS at 03:06

## 2022-06-27 RX ADMIN — HYDROCODONE BITARTRATE AND ACETAMINOPHEN 1 TABLET: 5; 325 TABLET ORAL at 07:06

## 2022-06-27 NOTE — FIRST PROVIDER EVALUATION
" Emergency Department TeleTriage Encounter Note      CHIEF COMPLAINT    Chief Complaint   Patient presents with    Fall     Pt states his " legs gave out on him and he fell down" last night right arm brusing and swelling noted, right shoulder pain, unknown LOC. No deformity, walks with steady gait        VITAL SIGNS   Initial Vitals [06/27/22 1305]   BP Pulse Resp Temp SpO2   (!) 181/83 74 20 98.6 °F (37 °C) (!) 92 %      MAP       --            ALLERGIES    Review of patient's allergies indicates:  No Known Allergies    PROVIDER TRIAGE NOTE  This is a teletriage evaluation of a 61 y.o. male presenting to the ED complaining of fall. Pt reports that his "legs gave out" on him last night, causing him to fall to the floor.  Pt reports that he awoke on the floor and believes he had LOC. Denies headache.  Reports right ribs and RUE pain.     No distress.  Pt moving RUE. Awake, alert.      Initial orders will be placed and care will be transferred to an alternate provider when patient is roomed for a full evaluation. Any additional orders and the final disposition will be determined by that provider.         ORDERS  Labs Reviewed   CBC W/ AUTO DIFFERENTIAL   COMPREHENSIVE METABOLIC PANEL   DRUG SCREEN PANEL, URINE EMERGENCY   TROPONIN I   URINALYSIS, REFLEX TO URINE CULTURE   POCT GLUCOSE MONITORING CONTINUOUS       ED Orders (720h ago, onward)    Start Ordered     Status Ordering Provider    06/27/22 1400 06/27/22 1318  Vital Signs  Every 2 hours         Ordered NIKO MCGOVERN N.    06/27/22 1319 06/27/22 1318  CBC auto differential  STAT         Ordered NIKO MCGOVERN    06/27/22 1319 06/27/22 1318  Comprehensive metabolic panel  STAT         Ordered NIKO MCGOVERN    06/27/22 1319 06/27/22 1318  Insert Saline lock IV  Once         Ordered NIKO MCGOVERN    06/27/22 1319 06/27/22 1318  EKG 12-lead  Once         Ordered NIKO MCGOVERN.    06/27/22 1319 06/27/22 1318  " POCT glucose  Once         Ordered GABRIELAVICKY NIKO N.    06/27/22 1319 06/27/22 1318  Drug screen panel, emergency  STAT         Ordered AMADEOPRISCILA NIKO N.    06/27/22 1319 06/27/22 1318  Troponin I  STAT         Ordered GABRIELAVICKY NIKO N.    06/27/22 1319 06/27/22 1318  Urinalysis, Reflex to Urine Culture Urine, Clean Catch  STAT         Ordered PBAUDI NIKO N.    06/27/22 1319 06/27/22 1318  Pulse Oximetry Continuous  Continuous         Ordered AMADEOPRISCILA NIKO N.    06/27/22 1319 06/27/22 1318  Cardiac Monitoring - Adult  Continuous        Comments: Notify Physician If:    Ordered PBAUDI NIKO N.    06/27/22 1318 06/27/22 1318  X-Ray Ribs 2 View Right  1 time imaging         Ordered NIKO MCGOVERN.            Virtual Visit Note: The provider triage portion of this emergency department evaluation and documentation was performed via [employer], a HIPAA-compliant telemedicine application, in concert with a tele-presenter in the room. A face to face patient evaluation with one of my colleagues will occur once the patient is placed in an emergency department room.      DISCLAIMER: This note was prepared with [employer] voice recognition transcription software. Garbled syntax, mangled pronouns, and other bizarre constructions may be attributed to that software system.

## 2022-06-27 NOTE — ED PROVIDER NOTES
"Encounter Date: 6/27/2022       History     Chief Complaint   Patient presents with    Fall     Pt states his " legs gave out on him and he fell down" last night right arm brusing and swelling noted, right shoulder pain, unknown LOC. No deformity, walks with steady gait      Homer Sanches is a 61 y.o. male who  has no past medical history on file.    The patient presents to the ED due to fall/syncope.   Patient reports last night, he felt weak and passed out. He woke up on the concrete floor.  He reports having R-sided chest pain as well as SOB and MISHRA. He also states his R leg has been more swollen than normal.  He denies any prior episodes of syncope. Denies any palpitations, fever, cough, N/V/D, abdominal pain, or any other concerns. Denies any headache, neck/back pain, or any other concerns. No known cardiac history.         Review of patient's allergies indicates:  No Known Allergies  No past medical history on file.  No past surgical history on file.  No family history on file.  Social History     Tobacco Use    Smoking status: Current Every Day Smoker     Types: Vaping with nicotine    Smokeless tobacco: Current User   Substance Use Topics    Alcohol use: Yes     Alcohol/week: 6.0 standard drinks     Types: 6 Cans of beer per week     Comment: daily    Drug use: Yes     Types: Marijuana     Review of Systems   Constitutional: Negative for chills and fever.   HENT: Negative for sore throat.    Respiratory: Positive for shortness of breath.    Cardiovascular: Positive for chest pain and leg swelling.   Gastrointestinal: Negative for abdominal pain, constipation, diarrhea, nausea and vomiting.   Genitourinary: Negative for dysuria, frequency and urgency.   Musculoskeletal: Negative for back pain.   Skin: Negative for rash and wound.   Neurological: Positive for syncope. Negative for weakness.   Hematological: Does not bruise/bleed easily.   Psychiatric/Behavioral: Negative for agitation, behavioral " problems and confusion.       Physical Exam     Initial Vitals [06/27/22 1305]   BP Pulse Resp Temp SpO2   (!) 181/83 74 20 98.6 °F (37 °C) (!) 92 %      MAP       --         Physical Exam    Nursing note and vitals reviewed.  Constitutional: He appears well-developed and well-nourished. He is not diaphoretic. No distress.   NC in place.    HENT:   Head: Normocephalic and atraumatic.   Mouth/Throat: Oropharynx is clear and moist.   Eyes: EOM are normal. Pupils are equal, round, and reactive to light.   Neck: No tracheal deviation present.   Cardiovascular: Normal rate, regular rhythm, normal heart sounds and intact distal pulses.   Pulmonary/Chest: Breath sounds normal. No stridor. No respiratory distress.   Abdominal: Abdomen is soft and protuberant. He exhibits no distension and no mass. There is no abdominal tenderness.   Obese, non-tender.  There is no rebound and no guarding.   Musculoskeletal:         General: Edema (BLE pitting) present. Normal range of motion.     Neurological: He is alert and oriented to person, place, and time. No cranial nerve deficit or sensory deficit.   Skin: Skin is warm and dry. Capillary refill takes less than 2 seconds. No rash noted.   Psychiatric: He has a normal mood and affect. His behavior is normal. Thought content normal.         ED Course   Procedures  Labs Reviewed   POCT GLUCOSE - Abnormal; Notable for the following components:       Result Value    POCT Glucose 117 (*)     All other components within normal limits   CBC W/ AUTO DIFFERENTIAL   COMPREHENSIVE METABOLIC PANEL   DRUG SCREEN PANEL, URINE EMERGENCY   TROPONIN I   URINALYSIS, REFLEX TO URINE CULTURE   POCT GLUCOSE MONITORING CONTINUOUS     EKG Readings: (Independently Interpreted)   Initial Reading: No STEMI. Previous EKG: Compared with most recent EKG Rhythm: Normal Sinus Rhythm.   Normal sinus rhythm, rate 68, T-wave inversions lateral leads, no ST elevations or other signs of ischemia, normal  intervals.  Compared prior from June 2019, T-wave changes are stable.       Imaging Results          X-Ray Ribs 2 View Right (Final result)  Result time 06/27/22 14:23:24    Final result by Len Hernandez MD (06/27/22 14:23:24)                 Impression:      1. Questionable cortical irregularity involving the distal aspect of right posterior rib 9 versus positional change, correlation with any focal tenderness recommended.      Electronically signed by: Len Hernandez MD  Date:    06/27/2022  Time:    14:23             Narrative:    EXAMINATION:  XR RIBS 2 VIEW RIGHT    CLINICAL HISTORY:  Unspecified fall, initial encounter    TECHNIQUE:  Two views of the right ribs were performed.    COMPARISON:  06/19/2019    FINDINGS:  Four views right ribs.    There is subtle cortical irregularity involving the distal aspect of right posterior rib 9 versus positional effect, correlation with any focal tenderness recommended.  The right glenohumeral joint is intact noting degenerative changes.  Degenerative changes are noted of the right acromioclavicular joint.  The visualized right lung zones are grossly clear allowing for technique.  Degenerative changes are noted of the spine.                                 Medications - No data to display  Medical Decision Making:   History:   Old Medical Records: I decided to obtain old medical records.  Old Records Summarized: records from clinic visits and other records.  Initial Assessment:   61-year-old male presents to ER after syncopal episode yesterday.  Endorses chest pain, shortness of breath, right leg swelling.  Will obtain cardiac evaluation, labs, CTA/ultrasound rule out PE/DVT, and continue to monitor.  Differential Diagnosis:   Differential Diagnosis includes, but is not limited to:  Arrhythmia, aortic dissection, MI/unstable angina, PE, cardiogenic shock, CHF, CVA/TIA, intracranial lesion/mass, seizure, perforated viscous, ruptured AAA, orthostatic hypotension,  vasovagal episode, anemia, dehydration, medication reaction, intentional overdose    Clinical Tests:   Lab Tests: Reviewed and Ordered  Radiological Study: Ordered and Reviewed  Medical Tests: Ordered and Reviewed  ED Management:  Workup including labs, troponin, ultrasound and CTA pending at shift change.  Oncoming physician to assume care, follow up on results, disposition accordingly.  Anticipate admission if patient remains hypoxic requiring supplemental O2.                      Clinical Impression:   Final diagnoses:  [W19.XXXA] Fall  [R55] Syncope  [M79.89] Leg swelling  [J96.01] Acute respiratory failure with hypoxia (Primary)                     Jin Moncada MD  06/27/22 1507

## 2022-06-28 PROBLEM — M79.89 SWELLING OF LOWER LEG: Status: ACTIVE | Noted: 2022-06-28

## 2022-06-28 PROBLEM — S22.39XA RIB FRACTURE: Status: ACTIVE | Noted: 2022-06-28

## 2022-06-28 PROBLEM — R93.89 ABNORMAL CHEST X-RAY: Status: ACTIVE | Noted: 2022-06-28

## 2022-06-28 PROBLEM — J96.02 ACUTE RESPIRATORY FAILURE WITH HYPOXIA AND HYPERCARBIA: Status: ACTIVE | Noted: 2022-06-27

## 2022-06-28 PROBLEM — R60.0 PERIPHERAL EDEMA: Status: ACTIVE | Noted: 2022-06-28

## 2022-06-28 LAB
ALBUMIN SERPL BCP-MCNC: 3.2 G/DL (ref 3.5–5.2)
ALLENS TEST: ABNORMAL
ALP SERPL-CCNC: 56 U/L (ref 55–135)
ALT SERPL W/O P-5'-P-CCNC: 14 U/L (ref 10–44)
ANION GAP SERPL CALC-SCNC: 8 MMOL/L (ref 8–16)
AORTIC ROOT ANNULUS: 3.41 CM
AST SERPL-CCNC: 12 U/L (ref 10–40)
AV INDEX (PROSTH): 0.41
AV MEAN GRADIENT: 14 MMHG
AV PEAK GRADIENT: 25 MMHG
AV VALVE AREA: 1.78 CM2
AV VELOCITY RATIO: 0.44
BASOPHILS # BLD AUTO: 0.01 K/UL (ref 0–0.2)
BASOPHILS NFR BLD: 0.2 % (ref 0–1.9)
BILIRUB SERPL-MCNC: 0.2 MG/DL (ref 0.1–1)
BSA FOR ECHO PROCEDURE: 2.92 M2
BUN SERPL-MCNC: 13 MG/DL (ref 6–20)
CALCIUM SERPL-MCNC: 9 MG/DL (ref 8.7–10.5)
CHLORIDE SERPL-SCNC: 103 MMOL/L (ref 95–110)
CO2 SERPL-SCNC: 28 MMOL/L (ref 23–29)
CREAT SERPL-MCNC: 1 MG/DL (ref 0.5–1.4)
CV ECHO LV RWT: 0.6 CM
DELSYS: ABNORMAL
DIFFERENTIAL METHOD: ABNORMAL
DOP CALC AO PEAK VEL: 2.5 M/S
DOP CALC AO VTI: 53.6 CM
DOP CALC LVOT AREA: 4.4 CM2
DOP CALC LVOT DIAMETER: 2.36 CM
DOP CALC LVOT PEAK VEL: 1.11 M/S
DOP CALC LVOT STROKE VOLUME: 95.27 CM3
DOP CALC MV VTI: 30.15 CM
DOP CALCLVOT PEAK VEL VTI: 21.79 CM
E WAVE DECELERATION TIME: 260.6 MSEC
E/A RATIO: 1.54
E/E' RATIO: 10.92 M/S
ECHO LV POSTERIOR WALL: 1.77 CM (ref 0.6–1.1)
EJECTION FRACTION: 55 %
EOSINOPHIL # BLD AUTO: 0.1 K/UL (ref 0–0.5)
EOSINOPHIL NFR BLD: 1.4 % (ref 0–8)
ERYTHROCYTE [DISTWIDTH] IN BLOOD BY AUTOMATED COUNT: 13 % (ref 11.5–14.5)
EST. GFR  (AFRICAN AMERICAN): >60 ML/MIN/1.73 M^2
EST. GFR  (NON AFRICAN AMERICAN): >60 ML/MIN/1.73 M^2
FRACTIONAL SHORTENING: 28 % (ref 28–44)
GLUCOSE SERPL-MCNC: 112 MG/DL (ref 70–110)
HCO3 UR-SCNC: 31 MMOL/L (ref 24–28)
HCT VFR BLD AUTO: 46.4 % (ref 40–54)
HGB BLD-MCNC: 15.1 G/DL (ref 14–18)
IMM GRANULOCYTES # BLD AUTO: 0.04 K/UL (ref 0–0.04)
IMM GRANULOCYTES NFR BLD AUTO: 0.6 % (ref 0–0.5)
INTERVENTRICULAR SEPTUM: 1.81 CM (ref 0.6–1.1)
LA MAJOR: 7.31 CM
LA MINOR: 6.55 CM
LA WIDTH: 5.24 CM
LEFT ATRIUM SIZE: 4.35 CM
LEFT ATRIUM VOLUME INDEX MOD: 28.5 ML/M2
LEFT ATRIUM VOLUME INDEX: 49.4 ML/M2
LEFT ATRIUM VOLUME MOD: 77.19 CM3
LEFT ATRIUM VOLUME: 133.86 CM3
LEFT INTERNAL DIMENSION IN SYSTOLE: 4.26 CM (ref 2.1–4)
LEFT VENTRICLE DIASTOLIC VOLUME INDEX: 64.39 ML/M2
LEFT VENTRICLE DIASTOLIC VOLUME: 174.49 ML
LEFT VENTRICLE MASS INDEX: 200 G/M2
LEFT VENTRICLE SYSTOLIC VOLUME INDEX: 30 ML/M2
LEFT VENTRICLE SYSTOLIC VOLUME: 81.41 ML
LEFT VENTRICULAR INTERNAL DIMENSION IN DIASTOLE: 5.92 CM (ref 3.5–6)
LEFT VENTRICULAR MASS: 541.32 G
LV LATERAL E/E' RATIO: 7.89 M/S
LV SEPTAL E/E' RATIO: 17.75 M/S
LYMPHOCYTES # BLD AUTO: 1.3 K/UL (ref 1–4.8)
LYMPHOCYTES NFR BLD: 19.8 % (ref 18–48)
MAGNESIUM SERPL-MCNC: 1.8 MG/DL (ref 1.6–2.6)
MCH RBC QN AUTO: 32.3 PG (ref 27–31)
MCHC RBC AUTO-ENTMCNC: 32.5 G/DL (ref 32–36)
MCV RBC AUTO: 99 FL (ref 82–98)
MONOCYTES # BLD AUTO: 0.6 K/UL (ref 0.3–1)
MONOCYTES NFR BLD: 9.1 % (ref 4–15)
MV PEAK A VEL: 0.46 M/S
MV PEAK E VEL: 0.71 M/S
MV PEAK GRADIENT: 2 MMHG
MV STENOSIS PRESSURE HALF TIME: 75.57 MS
MV VALVE AREA BY CONTINUITY EQUATION: 3.16 CM2
MV VALVE AREA P 1/2 METHOD: 2.91 CM2
NEUTROPHILS # BLD AUTO: 4.5 K/UL (ref 1.8–7.7)
NEUTROPHILS NFR BLD: 68.9 % (ref 38–73)
NRBC BLD-RTO: 0 /100 WBC
PCO2 BLDA: 58 MMHG (ref 35–45)
PH SMN: 7.34 [PH] (ref 7.35–7.45)
PHOSPHATE SERPL-MCNC: 3.2 MG/DL (ref 2.7–4.5)
PISA TR MAX VEL: 2.27 M/S
PLATELET # BLD AUTO: 174 K/UL (ref 150–450)
PMV BLD AUTO: 9 FL (ref 9.2–12.9)
PO2 BLDA: 82 MMHG (ref 80–100)
POC BE: 5 MMOL/L
POC SATURATED O2: 95 % (ref 95–100)
POC TCO2: 33 MMOL/L (ref 23–27)
POTASSIUM SERPL-SCNC: 3.7 MMOL/L (ref 3.5–5.1)
PROT SERPL-MCNC: 7.2 G/DL (ref 6–8.4)
PULM VEIN S/D RATIO: 1.2
PV PEAK D VEL: 0.49 M/S
PV PEAK S VEL: 0.59 M/S
RA MAJOR: 6.42 CM
RA PRESSURE: 3 MMHG
RA WIDTH: 3.69 CM
RBC # BLD AUTO: 4.67 M/UL (ref 4.6–6.2)
RV TISSUE DOPPLER FREE WALL SYSTOLIC VELOCITY 1 (APICAL 4 CHAMBER VIEW): 16.1 CM/S
SAMPLE: ABNORMAL
SITE: ABNORMAL
SODIUM SERPL-SCNC: 139 MMOL/L (ref 136–145)
STJ: 2.49 CM
TDI LATERAL: 0.09 M/S
TDI SEPTAL: 0.04 M/S
TDI: 0.07 M/S
TR MAX PG: 21 MMHG
TRICUSPID ANNULAR PLANE SYSTOLIC EXCURSION: 2.77 CM
TV REST PULMONARY ARTERY PRESSURE: 24 MMHG
WBC # BLD AUTO: 6.47 K/UL (ref 3.9–12.7)

## 2022-06-28 PROCEDURE — 25000003 PHARM REV CODE 250: Performed by: EMERGENCY MEDICINE

## 2022-06-28 PROCEDURE — 83735 ASSAY OF MAGNESIUM: CPT | Performed by: NURSE PRACTITIONER

## 2022-06-28 PROCEDURE — 85025 COMPLETE CBC W/AUTO DIFF WBC: CPT | Performed by: NURSE PRACTITIONER

## 2022-06-28 PROCEDURE — 63600175 PHARM REV CODE 636 W HCPCS: Performed by: NURSE PRACTITIONER

## 2022-06-28 PROCEDURE — 94761 N-INVAS EAR/PLS OXIMETRY MLT: CPT

## 2022-06-28 PROCEDURE — 27000221 HC OXYGEN, UP TO 24 HOURS

## 2022-06-28 PROCEDURE — 36600 WITHDRAWAL OF ARTERIAL BLOOD: CPT

## 2022-06-28 PROCEDURE — 87798 DETECT AGENT NOS DNA AMP: CPT | Mod: 59 | Performed by: INTERNAL MEDICINE

## 2022-06-28 PROCEDURE — 94640 AIRWAY INHALATION TREATMENT: CPT

## 2022-06-28 PROCEDURE — 25000242 PHARM REV CODE 250 ALT 637 W/ HCPCS: Performed by: INTERNAL MEDICINE

## 2022-06-28 PROCEDURE — 94760 N-INVAS EAR/PLS OXIMETRY 1: CPT

## 2022-06-28 PROCEDURE — 11000001 HC ACUTE MED/SURG PRIVATE ROOM

## 2022-06-28 PROCEDURE — 99900035 HC TECH TIME PER 15 MIN (STAT)

## 2022-06-28 PROCEDURE — 27000190 HC CPAP FULL FACE MASK W/VALVE

## 2022-06-28 PROCEDURE — 82803 BLOOD GASES ANY COMBINATION: CPT

## 2022-06-28 PROCEDURE — 84100 ASSAY OF PHOSPHORUS: CPT | Performed by: NURSE PRACTITIONER

## 2022-06-28 PROCEDURE — 80053 COMPREHEN METABOLIC PANEL: CPT | Performed by: NURSE PRACTITIONER

## 2022-06-28 PROCEDURE — 25000003 PHARM REV CODE 250: Performed by: NURSE PRACTITIONER

## 2022-06-28 PROCEDURE — 63600175 PHARM REV CODE 636 W HCPCS: Performed by: INTERNAL MEDICINE

## 2022-06-28 PROCEDURE — 87633 RESP VIRUS 12-25 TARGETS: CPT | Performed by: INTERNAL MEDICINE

## 2022-06-28 PROCEDURE — 94660 CPAP INITIATION&MGMT: CPT

## 2022-06-28 RX ORDER — FUROSEMIDE 10 MG/ML
20 INJECTION INTRAMUSCULAR; INTRAVENOUS ONCE
Status: COMPLETED | OUTPATIENT
Start: 2022-06-28 | End: 2022-06-28

## 2022-06-28 RX ORDER — ALBUTEROL SULFATE 2.5 MG/.5ML
2.5 SOLUTION RESPIRATORY (INHALATION)
Status: DISCONTINUED | OUTPATIENT
Start: 2022-06-28 | End: 2022-06-30 | Stop reason: HOSPADM

## 2022-06-28 RX ORDER — SODIUM CHLORIDE FOR INHALATION 3 %
4 VIAL, NEBULIZER (ML) INHALATION
Status: DISCONTINUED | OUTPATIENT
Start: 2022-06-28 | End: 2022-06-30 | Stop reason: HOSPADM

## 2022-06-28 RX ORDER — IPRATROPIUM BROMIDE AND ALBUTEROL SULFATE 2.5; .5 MG/3ML; MG/3ML
3 SOLUTION RESPIRATORY (INHALATION) EVERY 6 HOURS
Status: DISCONTINUED | OUTPATIENT
Start: 2022-06-28 | End: 2022-06-30 | Stop reason: HOSPADM

## 2022-06-28 RX ADMIN — FUROSEMIDE 20 MG: 10 INJECTION INTRAMUSCULAR; INTRAVENOUS at 02:06

## 2022-06-28 RX ADMIN — PREDNISONE 50 MG: 20 TABLET ORAL at 10:06

## 2022-06-28 RX ADMIN — HYDROCODONE BITARTRATE AND ACETAMINOPHEN 1 TABLET: 5; 325 TABLET ORAL at 02:06

## 2022-06-28 RX ADMIN — HYDROCODONE BITARTRATE AND ACETAMINOPHEN 1 TABLET: 5; 325 TABLET ORAL at 10:06

## 2022-06-28 RX ADMIN — FUROSEMIDE 20 MG: 10 INJECTION, SOLUTION INTRAMUSCULAR; INTRAVENOUS at 10:06

## 2022-06-28 RX ADMIN — LIDOCAINE 1 PATCH: 50 PATCH CUTANEOUS at 09:06

## 2022-06-28 RX ADMIN — IPRATROPIUM BROMIDE AND ALBUTEROL SULFATE 3 ML: 2.5; .5 SOLUTION RESPIRATORY (INHALATION) at 07:06

## 2022-06-28 NOTE — ED NOTES
Report received from ZACH Bird ([facility]).   Met with pt, resting on ER stretcher on left side.   Introductions made.   Bed is low and locked, SR up x 2.  Pt is a fall risk. Applied fall risk bracelet to right wrist.   Pt c/o pain on right lower chest and arm. Pt asking for something for pain. Will message ER provider.   VS updated. VSS. O2 at 2L NC. Note VBG needed. Placed O2 on standby.  Ensured personal belongings in reach. Provided call bell.   Questions and Concerns addressed.  Instructed to call with problems, needs, or concerns.  Pt verbalized understanding  Will continue to monitor

## 2022-06-28 NOTE — SUBJECTIVE & OBJECTIVE
Interval History: No overnight events. On 2L NC. CTA chest showing pulm edema, emphysema, and bibasilar GGO. Pt states he has vaped for several years and smoked cigarettes for about 2 years.     Review of Systems   Constitutional:  Negative for chills and fatigue.   HENT:  Negative for congestion, postnasal drip and rhinorrhea.    Eyes:  Negative for visual disturbance.   Respiratory:  Positive for chest tightness and shortness of breath. Negative for cough.    Cardiovascular:  Positive for leg swelling.   Gastrointestinal:  Negative for abdominal pain, constipation, diarrhea, nausea and vomiting.   Genitourinary:  Negative for difficulty urinating.   Musculoskeletal:  Positive for arthralgias and myalgias.   Skin:  Negative for color change and wound.   Neurological:  Positive for syncope.   Hematological:  Does not bruise/bleed easily.   Psychiatric/Behavioral:  Negative for agitation.    Objective:     Vital Signs (Most Recent):  Temp: 98.6 °F (37 °C) (06/28/22 1135)  Pulse: (!) 53 (06/28/22 1135)  Resp: 17 (06/28/22 1135)  BP: (!) 149/70 (06/28/22 1135)  SpO2: 95 % (06/28/22 1135)   Vital Signs (24h Range):  Temp:  [96.2 °F (35.7 °C)-98.6 °F (37 °C)] 98.6 °F (37 °C)  Pulse:  [53-66] 53  Resp:  [16-26] 17  SpO2:  [94 %-98 %] 95 %  BP: (126-161)/(65-81) 149/70     Weight: (!) 180.1 kg (397 lb)  Body mass index is 62.18 kg/m².    Intake/Output Summary (Last 24 hours) at 6/28/2022 1535  Last data filed at 6/28/2022 1500  Gross per 24 hour   Intake 250 ml   Output 1500 ml   Net -1250 ml      Physical Exam  Constitutional:       General: He is not in acute distress.     Appearance: He is not toxic-appearing.   HENT:      Head: Normocephalic.      Nose: No congestion or rhinorrhea.   Cardiovascular:      Rate and Rhythm: Normal rate and regular rhythm.   Pulmonary:      Effort: No respiratory distress.      Breath sounds: Rales present. No wheezing.      Comments: Poor air entry  Abdominal:      General: Bowel sounds  are normal.      Palpations: Abdomen is soft.   Musculoskeletal:      Cervical back: Normal range of motion and neck supple.      Right lower leg: No edema.      Left lower leg: No edema.   Skin:     General: Skin is warm and dry.   Neurological:      General: No focal deficit present.      Mental Status: He is alert and oriented to person, place, and time. Mental status is at baseline.   Psychiatric:         Mood and Affect: Mood normal.       Significant Labs: All pertinent labs within the past 24 hours have been reviewed.    Significant Imaging: I have reviewed all pertinent imaging results/findings within the past 24 hours.

## 2022-06-28 NOTE — ED NOTES
Unable to complete Orthostatics at this time. US at the bedside performing exam.  COVID collected and sent to lab.

## 2022-06-28 NOTE — ASSESSMENT & PLAN NOTE
CTA chest showing pulm edema, emphysema, and bibasilar GGO.  Possible COPD vs vape induced pneumonitis  TTE with normal systolic function, indeterminate LV diastolic function  Bipap qhs  Oxygen   Prednisone  Received dose of lasix, will give another  Duonebs  Resp culture and viral panel

## 2022-06-28 NOTE — ASSESSMENT & PLAN NOTE
History of Falls     Likely due to respiratory issues   bipap qhs  Oxygen  Repeat ABG in am    Continuous Cardiac monitoring  Fall precautions  EKG  Troponin-negative   Orthostatic blood pressure- pending  Neuro checks Q4hrs  Echo pending  Carotid US- pending   U/A not concerning for infectious process   EKG without ischemic changes
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
## 2022-06-28 NOTE — PLAN OF CARE
SW met with pt at bedside to discuss dc planning.     Pt confirmed information on chart. Pts reported that he resides in home alone. Pt reported that he is currently retired. Pt expressed no HH/DME at home. Pt reported upon dc his brother in law will transport him home. Pt reported that he is agreeable to PCC appointment. Pt expressed that he can call on his sister, Amber [phone] or brother in  law if he needs anything. SW wrote contact information on board for any questions or concerns. SW will continue to follow pt throughout his transitions of care and assist with any dc needs.     PCC requested.        06/28/22 4702   Discharge Assessment   Assessment Type Discharge Planning Assessment   Confirmed/corrected address, phone number and insurance Yes   Confirmed Demographics Correct on Facesheet   Source of Information patient   Communicated TAMANNA with patient/caregiver Yes   Reason For Admission Acute hypercapnic respiratory failure   Lives With alone   Do you expect to return to your current living situation? Yes   Do you have help at home or someone to help you manage your care at home? No   Prior to hospitilization cognitive status: Alert/Oriented   Current cognitive status: Alert/Oriented   Walking or Climbing Stairs Difficulty none   Dressing/Bathing Difficulty none   Home Layout Able to live on 1st floor   Equipment Currently Used at Home none   Patient currently being followed by outpatient case management? No   Do you currently have service(s) that help you manage your care at home? No   Do you take prescription medications? Yes   Do you have prescription coverage? Yes   Coverage Medicaid   Do you have any problems affording any of your prescribed medications? No   Is the patient taking medications as prescribed? no   Who is going to help you get home at discharge? Pts Brother in law   How do you get to doctors appointments? family or friend will provide   Are you on dialysis? No   Do you take coumadin?  No   Discharge Plan A Home   DME Needed Upon Discharge    (TBD)   Discharge Plan discussed with: Patient   Discharge Barriers Identified None
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
## 2022-06-28 NOTE — ED NOTES
Pt quietly resting on ER stretcher. Cardiac monitor in progress.  RR= and not labored, NADN.  Bed assigned and waiting to be ready.
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
## 2022-06-28 NOTE — PROGRESS NOTES
06/28/22 0202   Nurse Notification   Bedside Nurse Notified? Yes   Name of Bedside Nurse LewisRN   Nurse Notfication Method Secure Chat   Nurse Notified Of Other  (pt c/o pain 10/10 requesting pain medication)   Admission   Initial VN Admission Questions Complete   Shift   Pain Management Interventions pain management plan reviewed with patient/caregiver   Virtual Nurse - Patient Verbalized Approval Of Camera Use;VN Rounding   Safety/Activity   Patient Rounds bed in low position;call light in patient/parent reach;clutter free environment maintained;visualized patient;placement of personal items at bedside   Safety Promotion/Fall Prevention assistive device/personal item within reach;Fall Risk reviewed with patient/family;side rails raised x 2;bed alarm set   Positioning   Body Position supine   Head of Bed (HOB) Positioning HOB at 30-45 degrees   Pain/Comfort/Sleep   Pain Body Location other (see comments)  (ribs)   Pain Rating (0-10): Rest 10   VN cued in to pt's room with permission. Admission questions completed. Plan of care reviewed with pt. . Call bell w/in reach. Instructed to call for needs/assist oob.

## 2022-06-28 NOTE — NURSING
Report received from Cristy SERRANO, care assumed. Pt awake in bed watching TV, respiration unlabored on 3L of O2 via nasal cannula , patent IV , and no signs or symptoms of distressed . Pt board has been updated with RN information and plan for today . Pt has no questions or concerns at this time . Fall/safe precaution in place.

## 2022-06-28 NOTE — SUBJECTIVE & OBJECTIVE
No past medical history on file.    No past surgical history on file.    Review of patient's allergies indicates:  No Known Allergies    No current facility-administered medications on file prior to encounter.     Current Outpatient Medications on File Prior to Encounter   Medication Sig    ibuprofen (ADVIL,MOTRIN) 200 MG tablet Take 200 mg by mouth every 6 (six) hours as needed for Pain.    ketorolac (TORADOL) 10 mg tablet Take 10 mg by mouth every 6 (six) hours.    naproxen sodium (ANAPROX) 220 MG tablet Take 220 mg by mouth 2 (two) times daily as needed.    amLODIPine (NORVASC) 5 MG tablet Take 5 mg by mouth once daily.    atorvastatin (LIPITOR) 10 MG tablet Take 10 mg by mouth nightly.    methocarbamoL (ROBAXIN) 500 MG Tab Take 500 mg by mouth 3 (three) times daily. Take 2 tablets by mouth 3 times daily    tamsulosin (FLOMAX) 0.4 mg Cap Take 2 capsules by mouth once daily.    traMADoL (ULTRAM) 50 mg tablet Take 50 mg by mouth every 6 (six) hours as needed.     Family History    None       Tobacco Use    Smoking status: Current Every Day Smoker     Types: Vaping with nicotine    Smokeless tobacco: Current User   Substance and Sexual Activity    Alcohol use: Yes     Alcohol/week: 6.0 standard drinks     Types: 6 Cans of beer per week     Comment: daily    Drug use: Yes     Types: Marijuana    Sexual activity: Not on file     Review of Systems   Constitutional:  Negative for chills and fatigue.   HENT:  Negative for congestion, postnasal drip and rhinorrhea.    Eyes:  Negative for visual disturbance.   Respiratory:  Positive for chest tightness and shortness of breath. Negative for cough.    Cardiovascular:  Positive for leg swelling.   Gastrointestinal:  Negative for abdominal pain, constipation, diarrhea, nausea and vomiting.   Genitourinary:  Negative for difficulty urinating.   Musculoskeletal:  Positive for arthralgias and myalgias.   Skin:  Negative for color change and wound.   Neurological:  Positive for  syncope.   Hematological:  Does not bruise/bleed easily.   Psychiatric/Behavioral:  Negative for agitation.    Objective:     Vital Signs (Most Recent):  Temp: 97.4 °F (36.3 °C) (06/27/22 1706)  Pulse: 60 (06/27/22 2343)  Resp: (!) 22 (06/27/22 2054)  BP: (!) 150/69 (06/27/22 2326)  SpO2: 97 % (06/27/22 2343)   Vital Signs (24h Range):  Temp:  [97.4 °F (36.3 °C)-98.6 °F (37 °C)] 97.4 °F (36.3 °C)  Pulse:  [53-74] 60  Resp:  [18-26] 22  SpO2:  [90 %-98 %] 97 %  BP: (126-181)/(65-83) 150/69     Weight: (!) 158.8 kg (350 lb)  Body mass index is 54.82 kg/m².    Physical Exam  HENT:      Head: Normocephalic.      Nose: No congestion or rhinorrhea.   Cardiovascular:      Rate and Rhythm: Normal rate.   Pulmonary:      Effort: No respiratory distress.   Abdominal:      General: Bowel sounds are normal.      Palpations: Abdomen is soft.   Musculoskeletal:      Cervical back: Normal range of motion and neck supple.      Right lower leg: Edema present.   Skin:     General: Skin is warm and dry.   Neurological:      Mental Status: He is alert. Mental status is at baseline.   Psychiatric:         Mood and Affect: Mood normal.           Significant Labs: All pertinent labs within the past 24 hours have been reviewed.    Significant Imaging: I have reviewed all pertinent imaging results/findings within the past 24 hours.

## 2022-06-28 NOTE — ASSESSMENT & PLAN NOTE
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
## 2022-06-28 NOTE — PROVIDER PROGRESS NOTES - EMERGENCY DEPT.
"Encounter Date: 6/27/2022    ED Physician Progress Notes        Physician Note:   Care of this patient was transitioned to me by Dr. Moncada at shift change.  The patient with pain the lower extremity ultrasounds to rule out DVT and CTA of the chest to rule out PE.  I reviewed Dr. Mocnada's note as well as patient's EKG, labs, and previously performed right-sided rib x-ray.  He has no white count or troponin elevation.  Bilateral lower extremity venous ultrasound is negative for DVT.  CT of the chest is negative for PE but does reveal a right-sided fifth rib fracture that is nondisplaced as well as cardiomegaly and interstitial edema.  I obtained history from the patient and he reported no history of hypertension, diabetes, or cardiovascular disease.  He smoked a half pack of cigarettes daily for five years but now only vapes.  On exam, he is tachypneic with a pulse oximetry reading of 94% on 2 L nasal cannula.  Sats dropped to 88% on room air.  He states that his primary provider has told multiple times that his "oxygen levels are low but he has never been treated for this.  BNP is not elevated.  I will consult [facility] to admit.     "

## 2022-06-28 NOTE — PHARMACY MED REC
"  Admission Medication History     The home medication history was taken by Colette Wilson CPhT.    Medication history obtained from, Patient Verified    You may go to "Admission" then "Reconcile Home Medications" tabs to review and/or act upon these items.      The home medication list has been updated by the Pharmacy department.    Please read ALL comments highlighted in yellow.    Please address this information as you see fit.     Feel free to contact us if you have any questions or require assistance.      The medications listed below were removed from the home medication list.  Please reorder if appropriate:  Patient reports no longer taking the following medication(s):   Albuterol HFA 90 mcg   Azithromycin 250 mg   Cyclobenzaprine 5 mg   Etodolac 500 mg   Naproxen 500 mg      Colette Wilson CPhT.  Ext [phone]              .          "

## 2022-06-29 ENCOUNTER — TELEPHONE (OUTPATIENT)
Dept: CARDIOLOGY | Facility: CLINIC | Age: 62
End: 2022-06-29
Payer: MEDICAID

## 2022-06-29 DIAGNOSIS — R55 SYNCOPE AND COLLAPSE: ICD-10-CM

## 2022-06-29 DIAGNOSIS — I42.8 LEFT VENTRICULAR NONCOMPACTION: Primary | ICD-10-CM

## 2022-06-29 PROBLEM — J44.1 ACUTE EXACERBATION OF CHRONIC OBSTRUCTIVE PULMONARY DISEASE (COPD): Status: ACTIVE | Noted: 2022-06-29

## 2022-06-29 PROBLEM — E66.01 SEVERE OBESITY (BMI 35.0-35.9 WITH COMORBIDITY): Status: ACTIVE | Noted: 2022-06-29

## 2022-06-29 LAB
ADENOVIRUS: NOT DETECTED
ALBUMIN SERPL BCP-MCNC: 3.4 G/DL (ref 3.5–5.2)
ALP SERPL-CCNC: 56 U/L (ref 55–135)
ALT SERPL W/O P-5'-P-CCNC: 11 U/L (ref 10–44)
ANION GAP SERPL CALC-SCNC: 9 MMOL/L (ref 8–16)
AST SERPL-CCNC: 11 U/L (ref 10–40)
BASOPHILS # BLD AUTO: 0 K/UL (ref 0–0.2)
BASOPHILS NFR BLD: 0 % (ref 0–1.9)
BILIRUB SERPL-MCNC: 0.3 MG/DL (ref 0.1–1)
BORDETELLA PARAPERTUSSIS (IS1001): NOT DETECTED
BORDETELLA PERTUSSIS (PTXP): NOT DETECTED
BUN SERPL-MCNC: 11 MG/DL (ref 6–20)
CALCIUM SERPL-MCNC: 9.4 MG/DL (ref 8.7–10.5)
CHLAMYDIA PNEUMONIAE: NOT DETECTED
CHLORIDE SERPL-SCNC: 101 MMOL/L (ref 95–110)
CO2 SERPL-SCNC: 30 MMOL/L (ref 23–29)
CORONAVIRUS 229E, COMMON COLD VIRUS: NOT DETECTED
CORONAVIRUS HKU1, COMMON COLD VIRUS: NOT DETECTED
CORONAVIRUS NL63, COMMON COLD VIRUS: NOT DETECTED
CORONAVIRUS OC43, COMMON COLD VIRUS: NOT DETECTED
CREAT SERPL-MCNC: 0.8 MG/DL (ref 0.5–1.4)
DIFFERENTIAL METHOD: ABNORMAL
EOSINOPHIL # BLD AUTO: 0 K/UL (ref 0–0.5)
EOSINOPHIL NFR BLD: 0 % (ref 0–8)
ERYTHROCYTE [DISTWIDTH] IN BLOOD BY AUTOMATED COUNT: 12.6 % (ref 11.5–14.5)
EST. GFR  (AFRICAN AMERICAN): >60 ML/MIN/1.73 M^2
EST. GFR  (NON AFRICAN AMERICAN): >60 ML/MIN/1.73 M^2
FLUBV RNA NPH QL NAA+NON-PROBE: NOT DETECTED
GLUCOSE SERPL-MCNC: 99 MG/DL (ref 70–110)
HCT VFR BLD AUTO: 46.3 % (ref 40–54)
HGB BLD-MCNC: 15.3 G/DL (ref 14–18)
HPIV1 RNA NPH QL NAA+NON-PROBE: NOT DETECTED
HPIV2 RNA NPH QL NAA+NON-PROBE: NOT DETECTED
HPIV3 RNA NPH QL NAA+NON-PROBE: NOT DETECTED
HPIV4 RNA NPH QL NAA+NON-PROBE: NOT DETECTED
HUMAN METAPNEUMOVIRUS: NOT DETECTED
IMM GRANULOCYTES # BLD AUTO: 0.04 K/UL (ref 0–0.04)
IMM GRANULOCYTES NFR BLD AUTO: 0.5 % (ref 0–0.5)
INFLUENZA A (SUBTYPES H1,H1-2009,H3): NOT DETECTED
LYMPHOCYTES # BLD AUTO: 1.2 K/UL (ref 1–4.8)
LYMPHOCYTES NFR BLD: 14.4 % (ref 18–48)
MAGNESIUM SERPL-MCNC: 1.9 MG/DL (ref 1.6–2.6)
MCH RBC QN AUTO: 33.1 PG (ref 27–31)
MCHC RBC AUTO-ENTMCNC: 33 G/DL (ref 32–36)
MCV RBC AUTO: 100 FL (ref 82–98)
MONOCYTES # BLD AUTO: 0.7 K/UL (ref 0.3–1)
MONOCYTES NFR BLD: 8.3 % (ref 4–15)
MYCOPLASMA PNEUMONIAE: NOT DETECTED
NEUTROPHILS # BLD AUTO: 6.6 K/UL (ref 1.8–7.7)
NEUTROPHILS NFR BLD: 76.8 % (ref 38–73)
NRBC BLD-RTO: 0 /100 WBC
PHOSPHATE SERPL-MCNC: 2.5 MG/DL (ref 2.7–4.5)
PLATELET # BLD AUTO: 184 K/UL (ref 150–450)
PMV BLD AUTO: 9.2 FL (ref 9.2–12.9)
POTASSIUM SERPL-SCNC: 4 MMOL/L (ref 3.5–5.1)
PROT SERPL-MCNC: 7.1 G/DL (ref 6–8.4)
RBC # BLD AUTO: 4.62 M/UL (ref 4.6–6.2)
RESPIRATORY INFECTION PANEL SOURCE: NORMAL
RSV RNA NPH QL NAA+NON-PROBE: NOT DETECTED
RV+EV RNA NPH QL NAA+NON-PROBE: NOT DETECTED
SARS-COV-2 RNA RESP QL NAA+PROBE: NOT DETECTED
SODIUM SERPL-SCNC: 140 MMOL/L (ref 136–145)
WBC # BLD AUTO: 8.6 K/UL (ref 3.9–12.7)

## 2022-06-29 PROCEDURE — 87205 SMEAR GRAM STAIN: CPT | Performed by: INTERNAL MEDICINE

## 2022-06-29 PROCEDURE — 87070 CULTURE OTHR SPECIMN AEROBIC: CPT | Performed by: INTERNAL MEDICINE

## 2022-06-29 PROCEDURE — 25000003 PHARM REV CODE 250: Performed by: NURSE PRACTITIONER

## 2022-06-29 PROCEDURE — 99900035 HC TECH TIME PER 15 MIN (STAT)

## 2022-06-29 PROCEDURE — 83735 ASSAY OF MAGNESIUM: CPT | Performed by: NURSE PRACTITIONER

## 2022-06-29 PROCEDURE — 11000001 HC ACUTE MED/SURG PRIVATE ROOM

## 2022-06-29 PROCEDURE — 27000221 HC OXYGEN, UP TO 24 HOURS

## 2022-06-29 PROCEDURE — 84100 ASSAY OF PHOSPHORUS: CPT | Performed by: NURSE PRACTITIONER

## 2022-06-29 PROCEDURE — 94640 AIRWAY INHALATION TREATMENT: CPT

## 2022-06-29 PROCEDURE — 25000003 PHARM REV CODE 250: Performed by: EMERGENCY MEDICINE

## 2022-06-29 PROCEDURE — 97535 SELF CARE MNGMENT TRAINING: CPT

## 2022-06-29 PROCEDURE — 25000003 PHARM REV CODE 250: Performed by: INTERNAL MEDICINE

## 2022-06-29 PROCEDURE — 25000242 PHARM REV CODE 250 ALT 637 W/ HCPCS: Performed by: INTERNAL MEDICINE

## 2022-06-29 PROCEDURE — 99223 1ST HOSP IP/OBS HIGH 75: CPT | Mod: FS,,, | Performed by: INTERNAL MEDICINE

## 2022-06-29 PROCEDURE — 97161 PT EVAL LOW COMPLEX 20 MIN: CPT

## 2022-06-29 PROCEDURE — 63600175 PHARM REV CODE 636 W HCPCS: Performed by: INTERNAL MEDICINE

## 2022-06-29 PROCEDURE — 99223 PR INITIAL HOSPITAL CARE,LEVL III: ICD-10-PCS | Mod: FS,,, | Performed by: INTERNAL MEDICINE

## 2022-06-29 PROCEDURE — 97165 OT EVAL LOW COMPLEX 30 MIN: CPT

## 2022-06-29 PROCEDURE — 85025 COMPLETE CBC W/AUTO DIFF WBC: CPT | Performed by: NURSE PRACTITIONER

## 2022-06-29 PROCEDURE — 36415 COLL VENOUS BLD VENIPUNCTURE: CPT | Performed by: NURSE PRACTITIONER

## 2022-06-29 PROCEDURE — 80053 COMPREHEN METABOLIC PANEL: CPT | Performed by: NURSE PRACTITIONER

## 2022-06-29 PROCEDURE — 94799 UNLISTED PULMONARY SVC/PX: CPT

## 2022-06-29 PROCEDURE — 94761 N-INVAS EAR/PLS OXIMETRY MLT: CPT

## 2022-06-29 RX ORDER — AMLODIPINE BESYLATE 5 MG/1
5 TABLET ORAL DAILY
Status: DISCONTINUED | OUTPATIENT
Start: 2022-06-30 | End: 2022-06-29

## 2022-06-29 RX ORDER — ATORVASTATIN CALCIUM 10 MG/1
10 TABLET, FILM COATED ORAL NIGHTLY
Status: DISCONTINUED | OUTPATIENT
Start: 2022-06-29 | End: 2022-06-30 | Stop reason: HOSPADM

## 2022-06-29 RX ORDER — AMLODIPINE BESYLATE 5 MG/1
5 TABLET ORAL DAILY
Status: DISCONTINUED | OUTPATIENT
Start: 2022-06-29 | End: 2022-06-30 | Stop reason: HOSPADM

## 2022-06-29 RX ORDER — TAMSULOSIN HYDROCHLORIDE 0.4 MG/1
2 CAPSULE ORAL DAILY
Status: DISCONTINUED | OUTPATIENT
Start: 2022-06-30 | End: 2022-06-30 | Stop reason: HOSPADM

## 2022-06-29 RX ADMIN — ATORVASTATIN CALCIUM 10 MG: 10 TABLET, FILM COATED ORAL at 09:06

## 2022-06-29 RX ADMIN — HYDROCODONE BITARTRATE AND ACETAMINOPHEN 1 TABLET: 5; 325 TABLET ORAL at 03:06

## 2022-06-29 RX ADMIN — Medication 6 MG: at 09:06

## 2022-06-29 RX ADMIN — IPRATROPIUM BROMIDE AND ALBUTEROL SULFATE 3 ML: 2.5; .5 SOLUTION RESPIRATORY (INHALATION) at 08:06

## 2022-06-29 RX ADMIN — LIDOCAINE 1 PATCH: 50 PATCH CUTANEOUS at 09:06

## 2022-06-29 RX ADMIN — IPRATROPIUM BROMIDE AND ALBUTEROL SULFATE 3 ML: 2.5; .5 SOLUTION RESPIRATORY (INHALATION) at 01:06

## 2022-06-29 RX ADMIN — IPRATROPIUM BROMIDE AND ALBUTEROL SULFATE 3 ML: 2.5; .5 SOLUTION RESPIRATORY (INHALATION) at 07:06

## 2022-06-29 RX ADMIN — IPRATROPIUM BROMIDE AND ALBUTEROL SULFATE 3 ML: 2.5; .5 SOLUTION RESPIRATORY (INHALATION) at 12:06

## 2022-06-29 RX ADMIN — PREDNISONE 50 MG: 20 TABLET ORAL at 08:06

## 2022-06-29 RX ADMIN — AMLODIPINE BESYLATE 5 MG: 5 TABLET ORAL at 03:06

## 2022-06-29 NOTE — NURSING
Pt awake in bed AOX4, respiration unlabored on 3L of O2 via nasal cannula , patent IV , tele-monitor on and no signs or symptoms of distressed . Pt has no questions or concerns at this time . Fall/safe precaution in place.

## 2022-06-29 NOTE — SUBJECTIVE & OBJECTIVE
No past medical history on file.    No past surgical history on file.    Review of patient's allergies indicates:  No Known Allergies    No current facility-administered medications on file prior to encounter.     Current Outpatient Medications on File Prior to Encounter   Medication Sig    ibuprofen (ADVIL,MOTRIN) 200 MG tablet Take 200 mg by mouth every 6 (six) hours as needed for Pain.    ketorolac (TORADOL) 10 mg tablet Take 10 mg by mouth every 6 (six) hours.    naproxen sodium (ANAPROX) 220 MG tablet Take 220 mg by mouth 2 (two) times daily as needed.    amLODIPine (NORVASC) 5 MG tablet Take 5 mg by mouth once daily.    atorvastatin (LIPITOR) 10 MG tablet Take 10 mg by mouth nightly.    methocarbamoL (ROBAXIN) 500 MG Tab Take 500 mg by mouth 3 (three) times daily. Take 2 tablets by mouth 3 times daily    tamsulosin (FLOMAX) 0.4 mg Cap Take 2 capsules by mouth once daily.    traMADoL (ULTRAM) 50 mg tablet Take 50 mg by mouth every 6 (six) hours as needed.     Family History    None       Tobacco Use    Smoking status: Current Every Day Smoker     Types: Vaping with nicotine    Smokeless tobacco: Current User   Substance and Sexual Activity    Alcohol use: Yes     Alcohol/week: 6.0 standard drinks     Types: 6 Cans of beer per week     Comment: daily    Drug use: Yes     Types: Marijuana    Sexual activity: Not on file     Review of Systems   Constitutional: Negative. Negative for diaphoresis.   HENT: Negative.     Eyes: Negative.    Cardiovascular:  Positive for leg swelling and syncope. Negative for chest pain, irregular heartbeat, orthopnea and palpitations.   Respiratory:  Positive for sleep disturbances due to breathing and snoring. Negative for cough and shortness of breath.    Endocrine: Negative.    Hematologic/Lymphatic: Negative.    Skin: Negative.    Musculoskeletal:  Positive for falls and myalgias.   Gastrointestinal: Negative.    Genitourinary: Negative.    Psychiatric/Behavioral: Negative.      Objective:     Vital Signs (Most Recent):  Temp: 97.1 °F (36.2 °C) (06/29/22 0748)  Pulse: (!) 54 (06/29/22 0805)  Resp: 18 (06/29/22 0805)  BP: 137/63 (06/29/22 0748)  SpO2: 96 % (06/29/22 0805)   Vital Signs (24h Range):  Temp:  [96.5 °F (35.8 °C)-98.6 °F (37 °C)] 97.1 °F (36.2 °C)  Pulse:  [53-65] 54  Resp:  [16-22] 18  SpO2:  [93 %-96 %] 96 %  BP: (137-178)/(63-91) 137/63     Weight: (!) 180.1 kg (397 lb)  Body mass index is 62.18 kg/m².    SpO2: 96 %  O2 Device (Oxygen Therapy): nasal cannula      Intake/Output Summary (Last 24 hours) at 6/29/2022 0933  Last data filed at 6/29/2022 0500  Gross per 24 hour   Intake 250 ml   Output 1900 ml   Net -1650 ml       Lines/Drains/Airways       Peripheral Intravenous Line  Duration                  Peripheral IV - Single Lumen 06/27/22 1428 20 G Left Forearm 1 day                    Physical Exam  Constitutional:       General: He is not in acute distress.     Appearance: He is obese. He is not diaphoretic.   Eyes:      General:         Right eye: No discharge.         Left eye: No discharge.   Cardiovascular:      Rate and Rhythm: Normal rate and regular rhythm.   Pulmonary:      Effort: Pulmonary effort is normal.      Breath sounds: No rales.   Abdominal:      General: Bowel sounds are normal.      Palpations: Abdomen is soft.   Musculoskeletal:      Right lower leg: Edema present.      Left lower leg: Edema present.   Skin:     General: Skin is warm and dry.      Capillary Refill: Capillary refill takes less than 2 seconds.   Neurological:      Mental Status: He is alert and oriented to person, place, and time.   Psychiatric:         Mood and Affect: Mood normal.       Significant Labs: BMP:   Recent Labs   Lab 06/27/22  1422 06/28/22  0337 06/29/22  0340   GLU 96 112* 99    139 140   K 4.0 3.7 4.0    103 101   CO2 29 28 30*   BUN 12 13 11   CREATININE 1.0 1.0 0.8   CALCIUM 9.8 9.0 9.4   MG  --  1.8 1.9   , CMP   Recent Labs   Lab 06/27/22  0865  06/28/22  0337 06/29/22  0340    139 140   K 4.0 3.7 4.0    103 101   CO2 29 28 30*   GLU 96 112* 99   BUN 12 13 11   CREATININE 1.0 1.0 0.8   CALCIUM 9.8 9.0 9.4   PROT 7.6 7.2 7.1   ALBUMIN 3.3* 3.2* 3.4*   BILITOT 0.5 0.2 0.3   ALKPHOS 65 56 56   AST 17 12 11   ALT 14 14 11   ANIONGAP 8 8 9   ESTGFRAFRICA >60 >60 >60   EGFRNONAA >60 >60 >60   , CBC   Recent Labs   Lab 06/27/22  1422 06/27/22  1930 06/28/22  0337 06/29/22  0340   WBC 7.00  --  6.47 8.60   HGB 16.1  --  15.1 15.3   HCT 48.9   < > 46.4 46.3     --  174 184    < > = values in this interval not displayed.   , INR No results for input(s): INR, PROTIME in the last 48 hours., Lipid Panel   Recent Labs   Lab 06/27/22  2203   CHOL 163   HDL 51   LDLCALC 101.6   TRIG 52   CHOLHDL 31.3   , Troponin   Recent Labs   Lab 06/27/22  1422   TROPONINI 0.010   , and All pertinent lab results from the last 24 hours have been reviewed.    Significant Imaging: Echocardiogram: Transthoracic echo (TTE) complete (Cupid Only):   Results for orders placed or performed during the hospital encounter of 06/27/22   Echo   Result Value Ref Range    BSA 2.92 m2    TDI SEPTAL 0.04 m/s    LV LATERAL E/E' RATIO 7.89 m/s    LV SEPTAL E/E' RATIO 17.75 m/s    LA WIDTH 5.24 cm    TDI LATERAL 0.09 m/s    LVIDd 5.92 3.5 - 6.0 cm    IVS 1.81 (A) 0.6 - 1.1 cm    Posterior Wall 1.77 (A) 0.6 - 1.1 cm    Ao root annulus 3.41 cm    LVIDs 4.26 (A) 2.1 - 4.0 cm    FS 28 28 - 44 %    LA volume 133.86 cm3    STJ 2.49 cm    LV mass 541.32 g    LA size 4.35 cm    TAPSE 2.77 cm    RV S' 16.10 cm/s    Left Ventricle Relative Wall Thickness 0.60 cm    AV mean gradient 14 mmHg    AV valve area 1.78 cm2    AV Velocity Ratio 0.44     AV index (prosthetic) 0.41     MV valve area p 1/2 method 2.91 cm2    MV valve area by continuity eq 3.16 cm2    E/A ratio 1.54     Mean e' 0.07 m/s    E wave deceleration time 260.60 msec    Pulm vein S/D ratio 1.20     LVOT diameter 2.36 cm    LVOT area  4.4 cm2    LVOT peak dewayne 1.11 m/s    LVOT peak VTI 21.79 cm    Ao peak dewayne 2.50 m/s    Ao VTI 53.60 cm    LVOT stroke volume 95.27 cm3    AV peak gradient 25 mmHg    MV peak gradient 2 mmHg    E/E' ratio 10.92 m/s    MV Peak E Dewayne 0.71 m/s    TR Max Dewayne 2.27 m/s    MV VTI 30.15 cm    MV stenosis pressure 1/2 time 75.57 ms    MV Peak A Dewayne 0.46 m/s    PV Peak S Dewayne 0.59 m/s    PV Peak D Dewayne 0.49 m/s    LV Systolic Volume 81.41 mL    LV Systolic Volume Index 30.0 mL/m2    LV Diastolic Volume 174.49 mL    LV Diastolic Volume Index 64.39 mL/m2    LA Volume Index 49.4 mL/m2    LV Mass Index 200 g/m2    RA Major Axis 6.42 cm    Left Atrium Minor Axis 6.55 cm    Left Atrium Major Axis 7.31 cm    Triscuspid Valve Regurgitation Peak Gradient 21 mmHg    LA Volume Index (Mod) 28.5 mL/m2    LA volume (mod) 77.19 cm3    RA Width 3.69 cm    Right Atrial Pressure (from IVC) 3 mmHg    EF 55 %    TV rest pulmonary artery pressure 24 mmHg    Narrative    · The left ventricle is mildly enlarged with mild concentric hypertrophy   and normal systolic function.  · Normal right ventricular size with normal right ventricular systolic   function.  · The estimated ejection fraction is 55%.  · Prominent LV apical trabeculations. Borderline criteria for LV non   compaction. Consider cardiac MRI for better evaluation  · Severe left atrial enlargement.  · Indeterminate left ventricular diastolic function.  · Normal central venous pressure (3 mmHg).  · The estimated PA systolic pressure is 24 mmHg.  · There is mild aortic valve stenosis.  · Aortic valve area is 1.78 cm2; peak velocity is 2.50 m/s; mean gradient   is 14 mmHg.

## 2022-06-29 NOTE — SUBJECTIVE & OBJECTIVE
Interval History: No overnight events. On 2L NC. Viral PCR swab negative. Pt states he is feeling better. Still SOB, denies cough, fever, chills. Advised abstinence from smoking/vaping.     Review of Systems   Constitutional:  Negative for chills and fatigue.   HENT:  Negative for congestion, postnasal drip and rhinorrhea.    Eyes:  Negative for visual disturbance.   Respiratory:  Positive for shortness of breath. Negative for cough and chest tightness.    Cardiovascular:  Positive for leg swelling.   Gastrointestinal:  Negative for abdominal pain, constipation, diarrhea, nausea and vomiting.   Genitourinary:  Negative for difficulty urinating.   Musculoskeletal:  Positive for arthralgias and myalgias.   Skin:  Negative for color change and wound.   Neurological:  Negative for syncope.   Hematological:  Does not bruise/bleed easily.   Psychiatric/Behavioral:  Negative for agitation.    Objective:     Vital Signs (Most Recent):  Temp: 97.4 °F (36.3 °C) (06/29/22 1121)  Pulse: 65 (06/29/22 1312)  Resp: 18 (06/29/22 1312)  BP: (!) 175/81 (06/29/22 1121)  SpO2: 96 % (06/29/22 1312)   Vital Signs (24h Range):  Temp:  [96.5 °F (35.8 °C)-97.8 °F (36.6 °C)] 97.4 °F (36.3 °C)  Pulse:  [54-65] 65  Resp:  [16-22] 18  SpO2:  [90 %-96 %] 96 %  BP: (137-178)/(63-91) 175/81     Weight: (!) 180.1 kg (397 lb)  Body mass index is 62.18 kg/m².    Intake/Output Summary (Last 24 hours) at 6/29/2022 1502  Last data filed at 6/29/2022 1400  Gross per 24 hour   Intake --   Output 1600 ml   Net -1600 ml        Physical Exam  Constitutional:       General: He is not in acute distress.     Appearance: He is not toxic-appearing.   HENT:      Head: Normocephalic.      Nose: No congestion or rhinorrhea.   Cardiovascular:      Rate and Rhythm: Normal rate and regular rhythm.   Pulmonary:      Effort: No respiratory distress.      Breath sounds: Rales present. No wheezing.      Comments: Poor air entry  Abdominal:      General: Bowel sounds are  normal.      Palpations: Abdomen is soft.   Musculoskeletal:      Cervical back: Normal range of motion and neck supple.      Right lower leg: No edema.      Left lower leg: No edema.   Skin:     General: Skin is warm and dry.   Neurological:      General: No focal deficit present.      Mental Status: He is alert and oriented to person, place, and time. Mental status is at baseline.   Psychiatric:         Mood and Affect: Mood normal.       Significant Labs: All pertinent labs within the past 24 hours have been reviewed.    Significant Imaging: I have reviewed all pertinent imaging results/findings within the past 24 hours.

## 2022-06-29 NOTE — PLAN OF CARE
OT jaqui performed, report to follow    Pt may benefit from OP PT at discharge for remediation of deconditioning and pulmonary rehab    Problem: Occupational Therapy  Goal: Occupational Therapy Goal  Description: Goals to be met by: 7/29    Patient will increase functional independence with ADLs by performing:    UE Dressing with Modified [place].  LE Dressing with Modified Hughes.  Grooming while standing at sink with Modified Hughes.  Toileting from toilet with Modified Hughes for hygiene and clothing management.   Toilet transfer to toilet with Modified Hughes.  Upper extremity exercise program x10 reps per handout, with independence.    Outcome: Ongoing, Progressing
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
## 2022-06-29 NOTE — PT/OT/SLP EVAL
Physical Therapy Evaluation    Patient Name:  Homer Sanches   MRN:  [id number]    Recommendations:     Discharge Recommendations:  outpatient PT   Discharge Equipment Recommendations: none   Barriers to discharge: decreased endurance    Assessment:     Homer Sanches is a 61 y.o. male admitted with a medical diagnosis of Acute respiratory failure with hypoxia and hypercarbia.  He presents with the following impairments/functional limitations:  impaired endurance, gait instability, impaired balance, impaired cardiopulmonary response to activity . Upon arrival, pt was sitting on EOB on 2L/min O2 w/ RT SpO2 95%. Pt was Indep sit<>stand w/o AD. Pt amb 35ft w/o AD, has decreased endurance during session. Desat to SpO2 90%.      Rehab Prognosis: Good; patient would benefit from acute skilled PT services to address these deficits and reach maximum level of function.    Recent Surgery: * No surgery found *      Plan:     During this hospitalization, patient to be seen 2 x/week to address the identified rehab impairments via gait training, therapeutic activities, therapeutic exercises, neuromuscular re-education and progress toward the following goals:    · Plan of Care Expires:  07/29/22    Subjective     Chief Complaint: None stated   Patient/Family Comments/goals: Return home  Pain/Comfort:  · Pain Rating 1: 0/10    Patients cultural, spiritual, Cheondoism conflicts given the current situation: no    Living Environment:  Pt lives alone in 1SH w/ 10stairs w/ BHR upon entry.   Prior to admission, patients level of function was Independent.  Equipment used at home: none.  DME owned (not currently used): none.  Upon discharge, patient will have assistance from Niece and Brother in law.    Objective:     Communicated with Olga SERRANO prior to session.  Patient found sitting edge of bed with peripheral IV, oxygen, telemetry  upon PT entry to room.    General Precautions: Standard, fall, respiratory   Orthopedic  Precautions:N/A   Braces: N/A  Respiratory Status: Nasal cannula, flow 2 L/min    Exams:  · Cognitive Exam:  Patient is oriented to Person, Place, Time and Situation  · Gross Motor Coordination:  WFL  · Postural Exam:  Patient presented with the following abnormalities:    · -       No postural abnormalities identified  · RLE ROM: WFL  · RLE Strength: WFL  · LLE ROM: WFL  · LLE Strength: WFL    Functional Mobility:  · Transfers:     · Sit to Stand:  independence with no AD  · Gait: 35ft w/o AD, decreased endurance  · Balance: Good static and Fair+dynamic    Therapeutic Activities and Exercises:   Importance of mob, safety awareness, gait training for increased endurance     AM-PAC 6 CLICK MOBILITY  Total Score:20     Patient left sitting edge of bed with all lines intact, call button in reach and RN notified.    GOALS:   Multidisciplinary Problems     Physical Therapy Goals        Problem: Physical Therapy    Goal Priority Disciplines Outcome Goal Variances Interventions   Physical Therapy Goal     PT, PT/OT      Description: Goals to be met by: Discharge     Patient will increase functional independence with mobility by performin. Gait  x 150 feet with [place] using No Assistive Device.   2. Ascend/descend 10 stair with bilateral Handrails [place] using No Assistive Device.                      History:     No past medical history on file.    No past surgical history on file.    Time Tracking:     PT Received On: 22  PT Start Time: 1315     PT Stop Time: 1330  PT Total Time (min): 15 min     Billable Minutes: Evaluation 15      2022
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
## 2022-06-29 NOTE — PT/OT/SLP EVAL
Occupational Therapy   Evaluation    Name: Homer Sanches  MRN: [id number]  Admitting Diagnosis:  Acute respiratory failure with hypoxia and hypercarbia  Recent Surgery: * No surgery found *      Recommendations:     Discharge Recommendations: outpatient PT  Discharge Equipment Recommendations:  shower chair, other (see comments) (TBD)  Barriers to discharge:  None    Assessment:     Homer Sanches is a 61 y.o. male with a medical diagnosis of Acute respiratory failure with hypoxia and hypercarbia.  He presents with performance deficits affecting function: impaired endurance, impaired self care skills, weakness, impaired functional mobilty, impaired balance, decreased lower extremity function, edema, impaired cardiopulmonary response to activity.      Rehab Prognosis: Good; patient would benefit from acute skilled OT services to address these deficits and reach maximum level of function.       Plan:     Patient to be seen 3 x/week to address the above listed problems via self-care/home management, therapeutic activities, therapeutic exercises  · Plan of Care Expires: 07/29/22  · Plan of Care Reviewed with: patient    Subjective     Chief Complaint: SOB  Patient/Family Comments/goals: return to OF    Occupational Profile:  Living Environment: Lives alone in [facility] raised home [address] [employer], T/S  Previous level of function: indep  Roles and Routines: drives, cuts grass  Equipment Used at Home:  none  Assistance upon Discharge: family    Pain/Comfort:  · Pain Rating 1: 0/10  · Pain Rating Post-Intervention 1: 0/10    Patients cultural, spiritual, Worship conflicts given the current situation: no    Objective:     Communicated with: nurse prior to session.  Patient found HOB elevated with telemetry, peripheral IV, oxygen upon OT entry to room.    General Precautions: Standard, fall, respiratory   Orthopedic Precautions:    Braces:    Respiratory Status: Nasal cannula, flow 1 L/min    Occupational Performance:    Bed  Mobility:    · Patient completed Rolling/Turning to Left with  modified independence  · Patient completed Rolling/Turning to Right with modified independence  · Patient completed Scooting/Bridging with modified independence  · Patient completed Supine to Sit with modified independence    Functional Mobility/Transfers:  · Patient completed Sit <> Stand Transfer with stand by assistance  with  no assistive device   · Patient completed Toilet Transfer Step Transfer technique with stand by assistance with  no AD  · Functional Mobility: SBA no AD to bathroom    Activities of Daily Living:  · Toileting: stand by assistance      Cognitive/Visual Perceptual:  AO4    Physical Exam:  BUE AROM/strength WFL  Good sitting, fair+ stand balance    AMPAC 6 Click ADL:  AMPAC Total Score: 19    Treatment & Education:  Pt educated on role of OT/POC, diaphragmatic breathing, general safety  Education:    Patient left sitting edge of bed with all lines intact, call button in reach and Isabella, PCT(nurse) present    GOALS:   Multidisciplinary Problems     Occupational Therapy Goals        Problem: Occupational Therapy    Goal Priority Disciplines Outcome Interventions   Occupational Therapy Goal     OT, PT/OT Ongoing, Progressing    Description: Goals to be met by: 7/29    Patient will increase functional independence with ADLs by performing:    UE Dressing with Modified Jenkins.  LE Dressing with Modified Jenkins.  Grooming while standing at sink with Modified Jenkins.  Toileting from toilet with Modified Jenkins for hygiene and clothing management.   Toilet transfer to toilet with Modified Jenkins.  Upper extremity exercise program x10 reps per handout, with independence.                     History:     No past medical history on file.    No past surgical history on file.    Time Tracking:     OT Date of Treatment: 06/29/22  OT Start Time: 1108  OT Stop Time: 1128  OT Total Time (min): 20 min    Billable  Minutes:Evaluation 10  Self Care/Home Management 10    6/29/2022

## 2022-06-29 NOTE — TELEPHONE ENCOUNTER
Call placed to [facility] to schedule Cardiac MRI as requested by RACIEL Skelton.    Detailed message left with dept.
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
## 2022-06-29 NOTE — NURSING
Report received from Cristy SERRANO, care assumed. Pt awake in bed, respiration unlabored on 3L of O2 via nasal cannula , patent IV , and no signs or symptoms of distressed . Pt board has been updated with RN information and plan for today . Pt has no questions or concerns at this time . Fall/safe precaution in place.

## 2022-06-29 NOTE — ASSESSMENT & PLAN NOTE
History of Falls     Likely due to respiratory issues   bipap qhs  Oxygen  Repeat ABG in am    Continuous Cardiac monitoring  Fall precautions  EKG  Troponin-negative   Orthostatic blood pressure  Neuro checks Q4hrs  Carotid US WNL  U/A not concerning for infectious process   EKG without ischemic changes   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  Cardio consulted  Outpatient cardiac MRI and holter monitor

## 2022-06-29 NOTE — ASSESSMENT & PLAN NOTE
· The left ventricle is mildly enlarged with mild concentric hypertrophy and normal systolic function.  · Normal right ventricular size with normal right ventricular systolic function.  · The estimated ejection fraction is 55%.  · Prominent LV apical trabeculations. Borderline criteria for LV non compaction. Consider cardiac MRI for better evaluation  · Severe left atrial enlargement.  · Indeterminate left ventricular diastolic function.  · Normal central venous pressure (3 mmHg).  · The estimated PA systolic pressure is 24 mmHg.  · There is mild aortic valve stenosis.  · Aortic valve area is 1.78 cm2; peak velocity is 2.50 m/s; mean gradient is 14 mmHg.    30 day event monitor as outpatient   Cardiac MRI as outpatient   Will need cardiology follow up

## 2022-06-29 NOTE — ASSESSMENT & PLAN NOTE
CTA chest showing pulm edema, emphysema, and bibasilar GGO.  Possible COPD vs vape induced pneumonitis  Will require CHANDRIKA workup outpatient  TTE with normal systolic function, indeterminate LV diastolic function  Bipap qhs  Oxygen   Prednisone  Received dose of lasix x2  Duonebs  Viral panel negative  Resp culture pending

## 2022-06-30 VITALS
HEIGHT: 67 IN | OXYGEN SATURATION: 96 % | WEIGHT: 315 LBS | HEART RATE: 62 BPM | SYSTOLIC BLOOD PRESSURE: 151 MMHG | DIASTOLIC BLOOD PRESSURE: 70 MMHG | RESPIRATION RATE: 16 BRPM | TEMPERATURE: 99 F | BODY MASS INDEX: 49.44 KG/M2

## 2022-06-30 LAB
ALBUMIN SERPL BCP-MCNC: 3.1 G/DL (ref 3.5–5.2)
ALP SERPL-CCNC: 55 U/L (ref 55–135)
ALT SERPL W/O P-5'-P-CCNC: 15 U/L (ref 10–44)
ANION GAP SERPL CALC-SCNC: 11 MMOL/L (ref 8–16)
AST SERPL-CCNC: 16 U/L (ref 10–40)
BASOPHILS # BLD AUTO: 0.01 K/UL (ref 0–0.2)
BASOPHILS NFR BLD: 0.1 % (ref 0–1.9)
BILIRUB SERPL-MCNC: 0.4 MG/DL (ref 0.1–1)
BUN SERPL-MCNC: 17 MG/DL (ref 6–20)
CALCIUM SERPL-MCNC: 9.3 MG/DL (ref 8.7–10.5)
CHLORIDE SERPL-SCNC: 103 MMOL/L (ref 95–110)
CO2 SERPL-SCNC: 27 MMOL/L (ref 23–29)
CREAT SERPL-MCNC: 0.9 MG/DL (ref 0.5–1.4)
DIFFERENTIAL METHOD: ABNORMAL
EOSINOPHIL # BLD AUTO: 0 K/UL (ref 0–0.5)
EOSINOPHIL NFR BLD: 0.3 % (ref 0–8)
ERYTHROCYTE [DISTWIDTH] IN BLOOD BY AUTOMATED COUNT: 13 % (ref 11.5–14.5)
EST. GFR  (AFRICAN AMERICAN): >60 ML/MIN/1.73 M^2
EST. GFR  (NON AFRICAN AMERICAN): >60 ML/MIN/1.73 M^2
GLUCOSE SERPL-MCNC: 89 MG/DL (ref 70–110)
HCT VFR BLD AUTO: 44.4 % (ref 40–54)
HGB BLD-MCNC: 14.6 G/DL (ref 14–18)
IMM GRANULOCYTES # BLD AUTO: 0.03 K/UL (ref 0–0.04)
IMM GRANULOCYTES NFR BLD AUTO: 0.3 % (ref 0–0.5)
LYMPHOCYTES # BLD AUTO: 1.7 K/UL (ref 1–4.8)
LYMPHOCYTES NFR BLD: 19.5 % (ref 18–48)
MAGNESIUM SERPL-MCNC: 1.9 MG/DL (ref 1.6–2.6)
MCH RBC QN AUTO: 32.7 PG (ref 27–31)
MCHC RBC AUTO-ENTMCNC: 32.9 G/DL (ref 32–36)
MCV RBC AUTO: 100 FL (ref 82–98)
MONOCYTES # BLD AUTO: 0.7 K/UL (ref 0.3–1)
MONOCYTES NFR BLD: 7.7 % (ref 4–15)
NEUTROPHILS # BLD AUTO: 6.2 K/UL (ref 1.8–7.7)
NEUTROPHILS NFR BLD: 72.1 % (ref 38–73)
NRBC BLD-RTO: 0 /100 WBC
PHOSPHATE SERPL-MCNC: 3.1 MG/DL (ref 2.7–4.5)
PLATELET # BLD AUTO: 175 K/UL (ref 150–450)
PMV BLD AUTO: 9.1 FL (ref 9.2–12.9)
POTASSIUM SERPL-SCNC: 3.9 MMOL/L (ref 3.5–5.1)
PROT SERPL-MCNC: 7.2 G/DL (ref 6–8.4)
RBC # BLD AUTO: 4.46 M/UL (ref 4.6–6.2)
SODIUM SERPL-SCNC: 141 MMOL/L (ref 136–145)
WBC # BLD AUTO: 8.67 K/UL (ref 3.9–12.7)

## 2022-06-30 PROCEDURE — 99900035 HC TECH TIME PER 15 MIN (STAT)

## 2022-06-30 PROCEDURE — 94660 CPAP INITIATION&MGMT: CPT

## 2022-06-30 PROCEDURE — 63600175 PHARM REV CODE 636 W HCPCS: Performed by: INTERNAL MEDICINE

## 2022-06-30 PROCEDURE — 97535 SELF CARE MNGMENT TRAINING: CPT

## 2022-06-30 PROCEDURE — 97110 THERAPEUTIC EXERCISES: CPT | Mod: CQ

## 2022-06-30 PROCEDURE — 83735 ASSAY OF MAGNESIUM: CPT | Performed by: NURSE PRACTITIONER

## 2022-06-30 PROCEDURE — 94761 N-INVAS EAR/PLS OXIMETRY MLT: CPT

## 2022-06-30 PROCEDURE — 25000242 PHARM REV CODE 250 ALT 637 W/ HCPCS: Performed by: INTERNAL MEDICINE

## 2022-06-30 PROCEDURE — 36415 COLL VENOUS BLD VENIPUNCTURE: CPT | Performed by: NURSE PRACTITIONER

## 2022-06-30 PROCEDURE — 85025 COMPLETE CBC W/AUTO DIFF WBC: CPT | Performed by: NURSE PRACTITIONER

## 2022-06-30 PROCEDURE — 97116 GAIT TRAINING THERAPY: CPT | Mod: CQ

## 2022-06-30 PROCEDURE — 80053 COMPREHEN METABOLIC PANEL: CPT | Performed by: NURSE PRACTITIONER

## 2022-06-30 PROCEDURE — 94799 UNLISTED PULMONARY SVC/PX: CPT

## 2022-06-30 PROCEDURE — 25000003 PHARM REV CODE 250: Performed by: NURSE PRACTITIONER

## 2022-06-30 PROCEDURE — 27000221 HC OXYGEN, UP TO 24 HOURS

## 2022-06-30 PROCEDURE — 84100 ASSAY OF PHOSPHORUS: CPT | Performed by: NURSE PRACTITIONER

## 2022-06-30 PROCEDURE — 25000003 PHARM REV CODE 250: Performed by: INTERNAL MEDICINE

## 2022-06-30 PROCEDURE — 94640 AIRWAY INHALATION TREATMENT: CPT

## 2022-06-30 RX ORDER — LOSARTAN POTASSIUM 25 MG/1
25 TABLET ORAL DAILY
Qty: 30 TABLET | Refills: 0 | Status: SHIPPED | OUTPATIENT
Start: 2022-06-30 | End: 2023-06-30

## 2022-06-30 RX ORDER — BUDESONIDE AND FORMOTEROL FUMARATE DIHYDRATE 80; 4.5 UG/1; UG/1
2 AEROSOL RESPIRATORY (INHALATION) DAILY
Qty: 6.9 G | Refills: 0 | Status: SHIPPED | OUTPATIENT
Start: 2022-06-30 | End: 2022-07-11 | Stop reason: SDUPTHER

## 2022-06-30 RX ORDER — PREDNISONE 50 MG/1
50 TABLET ORAL DAILY
Qty: 2 TABLET | Refills: 0 | Status: SHIPPED | OUTPATIENT
Start: 2022-07-01 | End: 2022-07-03

## 2022-06-30 RX ORDER — ALBUTEROL SULFATE 2.5 MG/.5ML
2.5 SOLUTION RESPIRATORY (INHALATION) EVERY 6 HOURS PRN
Qty: 30 EACH | Refills: 0 | Status: SHIPPED | OUTPATIENT
Start: 2022-06-30 | End: 2023-06-30

## 2022-06-30 RX ORDER — LOSARTAN POTASSIUM 25 MG/1
25 TABLET ORAL DAILY
Status: DISCONTINUED | OUTPATIENT
Start: 2022-06-30 | End: 2022-06-30 | Stop reason: HOSPADM

## 2022-06-30 RX ADMIN — LOSARTAN POTASSIUM 25 MG: 25 TABLET, FILM COATED ORAL at 01:06

## 2022-06-30 RX ADMIN — IPRATROPIUM BROMIDE AND ALBUTEROL SULFATE 3 ML: 2.5; .5 SOLUTION RESPIRATORY (INHALATION) at 08:06

## 2022-06-30 RX ADMIN — IPRATROPIUM BROMIDE AND ALBUTEROL SULFATE 3 ML: 2.5; .5 SOLUTION RESPIRATORY (INHALATION) at 12:06

## 2022-06-30 RX ADMIN — IPRATROPIUM BROMIDE AND ALBUTEROL SULFATE 3 ML: 2.5; .5 SOLUTION RESPIRATORY (INHALATION) at 01:06

## 2022-06-30 RX ADMIN — TAMSULOSIN HYDROCHLORIDE 0.8 MG: 0.4 CAPSULE ORAL at 08:06

## 2022-06-30 RX ADMIN — AMLODIPINE BESYLATE 5 MG: 5 TABLET ORAL at 08:06

## 2022-06-30 RX ADMIN — PREDNISONE 50 MG: 20 TABLET ORAL at 08:06

## 2022-06-30 RX ADMIN — HYDROCODONE BITARTRATE AND ACETAMINOPHEN 1 TABLET: 5; 325 TABLET ORAL at 08:06

## 2022-06-30 NOTE — PROGRESS NOTES
[facility]                    Pharmacy       Discharge Medication Education    Patient ACCEPTED medication education. Pharmacy has provided education on the name, indication, and possible side effects of the medication(s) prescribed, using teach-back method.     The following medications have also been discussed, during this admission.        Medication List        START taking these medications      albuterol sulfate 2.5 mg/0.5 mL Nebu  Take 2.5 mg by nebulization every 6 (six) hours as needed (SOB and wheezing). Rescue     budesonide-formoterol 80-4.5 mcg 80-4.5 mcg/actuation Hfaa  Commonly known as: SYMBICORT  Inhale 2 puffs into the lungs once daily. Controller     losartan 25 MG tablet  Commonly known as: COZAAR  Take 1 tablet (25 mg total) by mouth once daily.     predniSONE 50 MG Tab  Commonly known as: DELTASONE  Take 1 tablet (50 mg total) by mouth once daily. for 2 days  Start taking on: July 1, 2022            CONTINUE taking these medications      amLODIPine 5 MG tablet  Commonly known as: NORVASC     atorvastatin 10 MG tablet  Commonly known as: LIPITOR     ibuprofen 200 MG tablet  Commonly known as: ADVIL,MOTRIN     ketorolac 10 mg tablet  Commonly known as: TORADOL     methocarbamoL 500 MG Tab  Commonly known as: ROBAXIN     naproxen sodium 220 MG tablet  Commonly known as: ANAPROX     tamsulosin 0.4 mg Cap  Commonly known as: FLOMAX     traMADoL 50 mg tablet  Commonly known as: ULTRAM               Where to Get Your Medications        These medications were sent to [facility] [employer] [id number] [address] [place] [facility] [address]  [address] [address]      Phone: [phone]   albuterol sulfate 2.5 mg/0.5 mL Nebu  budesonide-formoterol 80-4.5 mcg 80-4.5 mcg/actuation Hfaa  losartan 25 MG tablet  predniSONE 50 MG Tab          Thank you  Juany Brown, PharmD  [phone]

## 2022-06-30 NOTE — PLAN OF CARE
Problem: Physical Therapy  Goal: Physical Therapy Goal  Description: Goals to be met by: Discharge     Patient will increase functional independence with mobility by performin. Gait  x 150 feet with [place]ba using No Assistive Device.   2. Ascend/descend 10 stair with bilateral Handrails Montalba using No Assistive Device.     Outcome: Ongoing, Progressing   Pt on 3L O2, SPO2 95% @ rest. Sup to sit with Sergio, sit to stand mod I.   PT  FT /S AD and Indep. Demo/vcs for proper technique of BLE TE seated x 15 REPS.  Pt dem'd understanding.

## 2022-06-30 NOTE — PLAN OF CARE
Pt will dc with no needs noted. Pt will dc with Outpatient PT. Pt declined shower chair. Pt will be delivered home O2 and nebulizer to bedside. Pt reported that his niece will transport him home. SW will continue to follow pt throughout his transitions of care and assist with any dc needs.       Future Appointments   Date Time Provider Department Center   7/7/2022  1:00 PM Lis Chilel MD [facility] IMPRI [facility]   7/7/2022  3:00 PM MONITOR, ARRHYTHMIA EVENT [facility] ARRHPRO [facility]   8/2/2022  8:00 AM Scott Pollard, PT [facility] OP [facility]   8/30/2022  3:00 PM Ignacio Rosales MD [facility] PULMSVC [facility]   9/6/2022  8:00 AM [id number] [facility] MRI [facility]          06/30/22 1217   Final Note   Assessment Type Final Discharge Note   Anticipated Discharge Disposition Home  (Outpatient PT)   Hospital Resources/Appts/Education Provided Appointments scheduled by Navigator/Coordinator   Post-Acute Status   Discharge Delays None known at this time

## 2022-06-30 NOTE — NURSING
6 min walk test , Pt started on room air sitting at 93% . Pt drop to 87% at 4:36 min  and was put on 2L of O2 via nasal cannula . Pt went up to 90 % on 2L at  3:45 min . Pt dropped back down to 87% with 1 min left of walk test , Pt bump to 3L O2 via nasal cannula . Pt sitting on the side of the bed O2  90% on 3L , pt rested for a few mins O2 stat 93% , pt Oxygen bump down to 2L , stating at now at 94% on 2 L of O2 via nasal cannula . Pt now lying in bed , bed in lowest position , call light and belongings in reach , fall and safety precautions in place.
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
## 2022-06-30 NOTE — ASSESSMENT & PLAN NOTE
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

## 2022-06-30 NOTE — PROGRESS NOTES
Nebulizer, O2 setup, and additional tank delivered at bedside. Pt signed pt had questions about o2 tank informed him that nurse will setup before he leaves. Provided him with HME number for home concentrator.

## 2022-06-30 NOTE — ASSESSMENT & PLAN NOTE
CTA chest showing pulm edema, emphysema, and bibasilar GGO.  Possible COPD vs vape induced pneumonitis  Will require CHANDRIKA workup outpatient  TTE with normal systolic function, indeterminate LV diastolic function  Bipap qhs  Oxygen   Prednisone  Received dose of lasix x2  Duonebs  Viral panel negative  Resp culture collected, results pending  Cont prednisone to complete 5 day course. Add symbicort inh. Has qualified for home o2. Advised abstinence from smoking/vaping. Discharge with pulm follow up.

## 2022-06-30 NOTE — NURSING
Report received from Cristy SERRANO, care assumed. Pt awake in bed, respiration unlabored on 2L of O2 via nasal cannula , patent IV , and no signs or symptoms of distressed . Pt board has been updated with RN information and plan for today . Pt has no questions or concerns at this time . Fall/safe precaution in place.

## 2022-06-30 NOTE — PLAN OF CARE
Discharge order noted; awaiting completion of discharge order reconciliation in order to prepare AVS for patients discharge.

## 2022-06-30 NOTE — PT/OT/SLP PROGRESS
Physical Therapy Treatment    Patient Name:  Homer Sanches   MRN:  [id number]    Recommendations:     Discharge Recommendations:  outpatient PT   Discharge Equipment Recommendations: none   Barriers to discharge: None    Assessment:     Homer Sanches is a 61 y.o. male admitted with a medical diagnosis of Acute respiratory failure with hypoxia and hypercarbia.  He presents with the following impairments/functional limitations:  weakness, impaired endurance, impaired functional mobilty, gait instability, impaired balance, impaired cardiopulmonary response to activity.    Rehab Prognosis: Good; patient would benefit from acute skilled PT services to address these deficits and reach maximum level of function.    Recent Surgery: * No surgery found *      Plan:     During this hospitalization, patient to be seen 2 x/week to address the identified rehab impairments via gait training, therapeutic activities, therapeutic exercises, neuromuscular re-education and progress toward the following goals:    · Plan of Care Expires:  07/29/22    Subjective     Chief Complaint: none  Patient/Family Comments/goals: pt agreed to therapy  Pain/Comfort:  · Pain Rating 1: 0/10  · Pain Rating Post-Intervention 1: 0/10      Objective:     Communicated with nurse prior to session.  Patient found right sidelying with peripheral IV, oxygen, telemetry upon PT entry to room.     General Precautions: Standard, fall, respiratory   Orthopedic Precautions:N/A   Braces:    Respiratory Status: Nasal cannula, flow 3 L/min     Functional Mobility:  · Bed Mobility:     · Rolling Left:  modified independence  · Scooting: modified independence  · Supine to Sit: modified independence  · Transfers:     · Sit to Stand:  supervision with no AD  · Gait: 150ft /s AD with INDEP with some swaying but no LOB  · Balance: G standing      AM-PAC 6 CLICK MOBILITY  Turning over in bed (including adjusting bedclothes, sheets and blankets)?: 4  Sitting down on and  standing up from a chair with arms (e.g., wheelchair, bedside commode, etc.): 4  Moving from lying on back to sitting on the side of the bed?: 4  Moving to and from a bed to a chair (including a wheelchair)?: 4  Need to walk in hospital room?: 4  Climbing 3-5 steps with a railing?: 4  Basic Mobility Total Score: 24       Therapeutic Activities and Exercises:   BLE TE seated x 15 reps; LAQ, marches, HS, HR, TT, ABD/ADD (pillow squeezes.)    Patient left sitting edge of bed with all lines intact, call button in reach and nurse notified..    GOALS:   Multidisciplinary Problems     Physical Therapy Goals        Problem: Physical Therapy    Goal Priority Disciplines Outcome Goal Variances Interventions   Physical Therapy Goal     PT, PT/OT Ongoing, Progressing     Description: Goals to be met by: Discharge     Patient will increase functional independence with mobility by performin. Gait  x 150 feet with [place] using No Assistive Device.   2. Ascend/descend 10 stair with bilateral Handrails [place] using No Assistive Device.                      Time Tracking:     PT Received On: 22  PT Start Time: 1420     PT Stop Time: 1443  PT Total Time (min): 23 min     Billable Minutes: Gait Training 8 and Therapeutic Exercise 15    Treatment Type: Treatment  PT/PTA: PTA     PTA Visit Number: 0     2022

## 2022-06-30 NOTE — PT/OT/SLP PROGRESS
Occupational Therapy   Treatment    Name: Homer Sanches  MRN: [id number]  Admitting Diagnosis:  Acute respiratory failure with hypoxia and hypercarbia       Recommendations:     Discharge Recommendations: outpatient PT  Discharge Equipment Recommendations:  shower chair  Barriers to discharge:  None    Assessment:     Homer Sanches is a 61 y.o. male with a medical diagnosis of Acute respiratory failure with hypoxia and hypercarbia.  He presents with performance deficits affecting function are impaired endurance, impaired cardiopulmonary response to activity.     Rehab Prognosis:  Good; patient would benefit from acute skilled OT services to address these deficits and reach maximum level of function.       Plan:     Patient to be seen 3 x/week to address the above listed problems via self-care/home management, therapeutic activities, therapeutic exercises  · Plan of Care Expires: 07/29/22  · Plan of Care Reviewed with: patient    Subjective     Pain/Comfort:  · Pain Rating 1: 0/10  · Pain Rating Post-Intervention 1: 0/10    Objective:     Communicated with: nurse prior to session.  Patient found HOB elevated with peripheral IV, oxygen, telemetry upon OT entry to room.    General Precautions: Standard, fall, respiratory   Orthopedic Precautions:    Braces:    Respiratory Status: Nasal cannula, flow 2 L/min     Occupational Performance:     Bed Mobility:    · Patient completed Rolling/Turning to Right with modified independence  · Patient completed Scooting/Bridging with modified independence  · Patient completed Supine to Sit with modified independence     Functional Mobility/Transfers:  · Patient completed Sit <> Stand Transfer with modified independence  with  no assistive device   · Functional Mobility: mod I in room no AD    Activities of Daily Living:  · Grooming: modified independence standing at sink  · Bathing: modified independence standing at sink  · Upper Body Dressing: modified independence    · Lower  Body Dressing: modified independence        [facility] 6 Click ADL: 24    Treatment & Education:  Pt performed ADLs and mobility as above in standing. Mod cues for diaphragmatic breathing. Education on energy conservation/work simplification    Patient left sitting edge of bed with all lines intact, call button in reach and nurse notifiedEducation:      GOALS:   Multidisciplinary Problems     Occupational Therapy Goals     Not on file          Multidisciplinary Problems (Resolved)        Problem: Occupational Therapy    Goal Priority Disciplines Outcome Interventions   Occupational Therapy Goal   (Resolved)     OT, PT/OT Met    Description: Goals to be met by: 7/29    Patient will increase functional independence with ADLs by performing:    UE Dressing with Modified [place].  LE Dressing with Modified [place].  Grooming while standing at sink with Modified [place].  Toileting from toilet with Modified [place] for hygiene and clothing management.   Toilet transfer to toilet with Modified [place].  Upper extremity exercise program x10 reps per handout, with independence.                     Time Tracking:     OT Date of Treatment: 06/30/22  OT Start Time: 0934  OT Stop Time: 0957  OT Total Time (min): 23 min    Billable Minutes:Self Care/Home Management 23 6/30/2022

## 2022-06-30 NOTE — PLAN OF CARE
Pt mod I for ADLs and fxnl mobility this morning. Mod cues for diaphragmatic breathing, education on energy conservation/work simplification provided    Problem: Occupational Therapy  Goal: Occupational Therapy Goal  Description: Goals to be met by: 7/29    Patient will increase functional independence with ADLs by performing:    UE Dressing with Modified [place].  LE Dressing with Modified [place].  Grooming while standing at sink with Modified [place].  Toileting from toilet with Modified [place] for hygiene and clothing management.   Toilet transfer to toilet with Modified [place].  Upper extremity exercise program x10 reps per handout, with independence.    Outcome: Met

## 2022-06-30 NOTE — NURSING
Home Oxygen Evaluation    Date Performed: 2022    1) Patient's Home O2 Sat on room air, while at rest @      93%.     2) Patient's O2 Sat on room air while exercisin% .        3) Patient's O2 Sat while exercising on O2:  90% at 3 LPM         .

## 2022-06-30 NOTE — PROGRESS NOTES
06/30/22 1419   AVS Confirmation   Discharge instructions and AVS given to and reviewed with patient and/or significant other. Yes   AVS printed and handed to patient by bedside nurse. VN reviewed discharge instructions with patient using teachback method.  Allowed time for questions, all questions answered.  Patient verbalized complete understanding of discharge instructions and voices no concerns. Discharge instructions complete. Transport wheelchair requested. Bedside nurse notified.

## 2022-07-02 LAB
BACTERIA SPEC AEROBE CULT: NORMAL
BACTERIA SPEC AEROBE CULT: NORMAL
GRAM STN SPEC: NORMAL

## 2022-07-07 ENCOUNTER — CLINICAL SUPPORT (OUTPATIENT)
Dept: CARDIOLOGY | Facility: HOSPITAL | Age: 62
End: 2022-07-07
Attending: NURSE PRACTITIONER
Payer: MEDICAID

## 2022-07-07 DIAGNOSIS — R55 SYNCOPE AND COLLAPSE: ICD-10-CM

## 2022-07-11 ENCOUNTER — OFFICE VISIT (OUTPATIENT)
Dept: PRIMARY CARE CLINIC | Facility: CLINIC | Age: 62
End: 2022-07-11
Payer: MEDICAID

## 2022-07-11 VITALS
HEIGHT: 67 IN | DIASTOLIC BLOOD PRESSURE: 72 MMHG | BODY MASS INDEX: 49.44 KG/M2 | SYSTOLIC BLOOD PRESSURE: 121 MMHG | TEMPERATURE: 99 F | WEIGHT: 315 LBS | OXYGEN SATURATION: 87 % | HEART RATE: 69 BPM

## 2022-07-11 DIAGNOSIS — N40.0 ENLARGED PROSTATE: ICD-10-CM

## 2022-07-11 DIAGNOSIS — R55 SYNCOPE, UNSPECIFIED SYNCOPE TYPE: ICD-10-CM

## 2022-07-11 DIAGNOSIS — J96.01 ACUTE RESPIRATORY FAILURE WITH HYPOXIA AND HYPERCARBIA: ICD-10-CM

## 2022-07-11 DIAGNOSIS — J96.02 ACUTE RESPIRATORY FAILURE WITH HYPOXIA AND HYPERCARBIA: ICD-10-CM

## 2022-07-11 DIAGNOSIS — G47.39 OTHER SLEEP APNEA: ICD-10-CM

## 2022-07-11 DIAGNOSIS — J44.1 ACUTE EXACERBATION OF CHRONIC OBSTRUCTIVE PULMONARY DISEASE (COPD): Primary | ICD-10-CM

## 2022-07-11 PROCEDURE — 4010F ACE/ARB THERAPY RXD/TAKEN: CPT | Mod: CPTII,,, | Performed by: INTERNAL MEDICINE

## 2022-07-11 PROCEDURE — 99999 PR PBB SHADOW E&M-EST. PATIENT-LVL III: ICD-10-PCS | Mod: PBBFAC,,, | Performed by: INTERNAL MEDICINE

## 2022-07-11 PROCEDURE — 3074F PR MOST RECENT SYSTOLIC BLOOD PRESSURE < 130 MM HG: ICD-10-PCS | Mod: CPTII,,, | Performed by: INTERNAL MEDICINE

## 2022-07-11 PROCEDURE — 99999 PR PBB SHADOW E&M-EST. PATIENT-LVL III: CPT | Mod: PBBFAC,,, | Performed by: INTERNAL MEDICINE

## 2022-07-11 PROCEDURE — 1111F DSCHRG MED/CURRENT MED MERGE: CPT | Mod: CPTII,,, | Performed by: INTERNAL MEDICINE

## 2022-07-11 PROCEDURE — 3078F PR MOST RECENT DIASTOLIC BLOOD PRESSURE < 80 MM HG: ICD-10-PCS | Mod: CPTII,,, | Performed by: INTERNAL MEDICINE

## 2022-07-11 PROCEDURE — 99213 OFFICE O/P EST LOW 20 MIN: CPT | Mod: PBBFAC,PO | Performed by: INTERNAL MEDICINE

## 2022-07-11 PROCEDURE — 3074F SYST BP LT 130 MM HG: CPT | Mod: CPTII,,, | Performed by: INTERNAL MEDICINE

## 2022-07-11 PROCEDURE — 99205 OFFICE O/P NEW HI 60 MIN: CPT | Mod: S$PBB,,, | Performed by: INTERNAL MEDICINE

## 2022-07-11 PROCEDURE — 3078F DIAST BP <80 MM HG: CPT | Mod: CPTII,,, | Performed by: INTERNAL MEDICINE

## 2022-07-11 PROCEDURE — 1159F MED LIST DOCD IN RCRD: CPT | Mod: CPTII,,, | Performed by: INTERNAL MEDICINE

## 2022-07-11 PROCEDURE — 3008F PR BODY MASS INDEX (BMI) DOCUMENTED: ICD-10-PCS | Mod: CPTII,,, | Performed by: INTERNAL MEDICINE

## 2022-07-11 PROCEDURE — 4010F PR ACE/ARB THEARPY RXD/TAKEN: ICD-10-PCS | Mod: CPTII,,, | Performed by: INTERNAL MEDICINE

## 2022-07-11 PROCEDURE — 1159F PR MEDICATION LIST DOCUMENTED IN MEDICAL RECORD: ICD-10-PCS | Mod: CPTII,,, | Performed by: INTERNAL MEDICINE

## 2022-07-11 PROCEDURE — 3008F BODY MASS INDEX DOCD: CPT | Mod: CPTII,,, | Performed by: INTERNAL MEDICINE

## 2022-07-11 PROCEDURE — 1111F PR DISCHARGE MEDS RECONCILED W/ CURRENT OUTPATIENT MED LIST: ICD-10-PCS | Mod: CPTII,,, | Performed by: INTERNAL MEDICINE

## 2022-07-11 PROCEDURE — 99205 PR OFFICE/OUTPT VISIT, NEW, LEVL V, 60-74 MIN: ICD-10-PCS | Mod: S$PBB,,, | Performed by: INTERNAL MEDICINE

## 2022-07-11 PROCEDURE — 1160F RVW MEDS BY RX/DR IN RCRD: CPT | Mod: CPTII,,, | Performed by: INTERNAL MEDICINE

## 2022-07-11 PROCEDURE — 1160F PR REVIEW ALL MEDS BY PRESCRIBER/CLIN PHARMACIST DOCUMENTED: ICD-10-PCS | Mod: CPTII,,, | Performed by: INTERNAL MEDICINE

## 2022-07-11 RX ORDER — BUDESONIDE AND FORMOTEROL FUMARATE DIHYDRATE 80; 4.5 UG/1; UG/1
2 AEROSOL RESPIRATORY (INHALATION) DAILY
Qty: 6.9 G | Refills: 11 | Status: SHIPPED | OUTPATIENT
Start: 2022-07-11 | End: 2023-07-11

## 2022-07-11 RX ORDER — TAMSULOSIN HYDROCHLORIDE 0.4 MG/1
2 CAPSULE ORAL DAILY
Qty: 60 CAPSULE | Refills: 3 | Status: SHIPPED | OUTPATIENT
Start: 2022-07-11

## 2022-07-11 NOTE — PROGRESS NOTES
[facility]   New Visit Progress Note   Recent Hospital Discharge     PRESENTING HISTORY     Chief Complaint/Reason for Admission:  Follow up Hospital Discharge   PCP: Primary Doctor No    History of Present Illness:  Mr. Homer Sanches is a 61 y.o. male who was recently admitted to the hospital.    [facility]  Discharge Summary        Patient Name: Homer Sanches  MRN: [id number]  Patient Class: IP- Inpatient  Admission Date: 6/27/2022  Hospital Length of Stay: 2 days  Discharge Date and Time: No discharge date for patient encounter.  Attending Physician: Aryan Chaudhry MD   Discharging Provider: Aryan Chaudhry MD  Primary Care Provider: Primary Doctor No  ___________________________________________________________________    Today:  [facility] for [facility] follow up.  Recently hospitalized following syncopal episode at home.   Self discontinued all long term medications weeks prior to presentation.   Work up significant for acute hypercapnic and hypoxic respiratory failure.   CTA chest with pulmonary edema, emphysema, and bibasilar ground glass opacities.   Also with acute non displaced right 5th rib fx.   Patient admitted to [facility].   Treated with prednisone, Duonebs, and supplemental oxygen alternating with Bipap.  Viral Respiratory panel NEG.   COVID NEG.   U tox POS cocaine.   Seen in consultation with Cardiology team.  2 D echo significant for prominent LV apical trabeculations.  Outpatient cardiac MRI, 30 day event monitor, and outpatient sleep study recommended.   Patient responded well to above interventions and supportive care.  Symbicort added to outpatient regimen.  Home oxygen ordered at discharge.   Patient discharged to home.     Patient unaccompanied today.  Ambulatory and independent with ADL's.  Left supplemental oxygen in car.  Reports compliance with all medication.     Review of Systems  General ROS: negative  for chills, fever or weight loss  Psychological ROS: negative for hallucination, depression or suicidal ideation  Ophthalmic ROS: negative for blurry vision, photophobia or eye pain  ENT ROS: negative for epistaxis, sore throat or rhinorrhea  Respiratory ROS: no cough, shortness of breath, or wheezing  Cardiovascular ROS: no chest pain + dyspnea on exertion  Gastrointestinal ROS: no abdominal pain, change in bowel habits, or black/ bloody stools  Genito-Urinary ROS: no dysuria, trouble voiding, or hematuria  Musculoskeletal ROS: negative for gait disturbance or muscular weakness  Neurological ROS: no syncope or seizures; no ataxia  Dermatological ROS: negative for pruritis, rash and jaundice          PAST HISTORY:     No past medical history on file.    No past surgical history on file.    No family history on file.      MEDICATIONS & ALLERGIES:     Current Outpatient Medications on File Prior to Visit   Medication Sig Dispense Refill    albuterol sulfate 2.5 mg/0.5 mL Nebu Take 2.5 mg by nebulization every 6 (six) hours as needed (SOB and wheezing). Rescue 30 each 0    amLODIPine (NORVASC) 5 MG tablet Take 5 mg by mouth once daily.      atorvastatin (LIPITOR) 10 MG tablet Take 10 mg by mouth nightly.      ibuprofen (ADVIL,MOTRIN) 200 MG tablet Take 200 mg by mouth every 6 (six) hours as needed for Pain.      ketorolac (TORADOL) 10 mg tablet Take 10 mg by mouth every 6 (six) hours.      losartan (COZAAR) 25 MG tablet Take 1 tablet (25 mg total) by mouth once daily. 30 tablet 0    methocarbamoL (ROBAXIN) 500 MG Tab Take 500 mg by mouth 3 (three) times daily. Take 2 tablets by mouth 3 times daily      naproxen sodium (ANAPROX) 220 MG tablet Take 220 mg by mouth 2 (two) times daily as needed.      tamsulosin (FLOMAX) 0.4 mg Cap Take 2 capsules by mouth once daily.      traMADoL (ULTRAM) 50 mg tablet Take 50 mg by mouth every 6 (six) hours as needed.      budesonide-formoterol 80-4.5 mcg (SYMBICORT) 80-4.5  "mcg/actuation HFAA Inhale 2 puffs into the lungs once daily. Controller (Patient not taking: Reported on 7/11/2022) 6.9 g 0     No current facility-administered medications on file prior to visit.        Review of patient's allergies indicates:  No Known Allergies    OBJECTIVE:     Vital Signs:  /72 (BP Location: Left arm, Patient Position: Sitting)   Pulse 69   Temp 98.5 °F (36.9 °C) (Oral)   Ht 5' 7" (1.702 m)   Wt (!) 169.9 kg (374 lb 9 oz)   SpO2 (!) 87%   BMI 58.66 kg/m²   Wt Readings from Last 3 Encounters:   07/11/22 1458 (!) 169.9 kg (374 lb 9 oz)   06/28/22 0809 (!) 180.1 kg (397 lb)   06/28/22 0140 (!) 180.4 kg (397 lb 11.4 oz)   06/27/22 1305 (!) 158.8 kg (350 lb)   06/20/22 1746 (!) 158.8 kg (350 lb)     Body mass index is 58.66 kg/m².   (!) 87%    Physical Exam:  /72 (BP Location: Left arm, Patient Position: Sitting)   Pulse 69   Temp 98.5 °F (36.9 °C) (Oral)   Ht 5' 7" (1.702 m)   Wt (!) 169.9 kg (374 lb 9 oz)   SpO2 (!) 87%   BMI 58.66 kg/m²   General appearance: alert, cooperative, no distress  Constitutional:Oriented to person, place, and time  + appears well-developed and well-nourished.   HEENT: Normocephalic, atraumatic, neck symmetrical, no nasal discharge   Eyes: conjunctivae/corneas clear, PERRL, EOM's intact  Lungs: clear to auscultation bilaterally, no dullness to percussion bilaterally  Heart: regular rate and rhythm without rub; no displacement of the PMI   Abdomen: soft, non-tender; bowel sounds normoactive; no organomegaly  Extremities: extremities symmetric; no clubbing, cyanosis, or edema  Integument: Skin color, texture, turgor normal; no rashes; hair distrubution normal  Neurologic: Alert and oriented X 3, normal strength, normal coordination and gait  Psychiatric: no pressured speech; normal affect; no evidence of impaired cognition     Laboratory  Lab Results   Component Value Date    WBC 8.67 06/30/2022    HGB 14.6 06/30/2022    HCT 44.4 06/30/2022    MCV " 100 (H) 06/30/2022     06/30/2022     BMP  Lab Results   Component Value Date     06/30/2022    K 3.9 06/30/2022     06/30/2022    CO2 27 06/30/2022    BUN 17 06/30/2022    CREATININE 0.9 06/30/2022    CALCIUM 9.3 06/30/2022    ANIONGAP 11 06/30/2022    ESTGFRAFRICA >60 06/30/2022    EGFRNONAA >60 06/30/2022     Lab Results   Component Value Date    ALT 15 06/30/2022    AST 16 06/30/2022    ALKPHOS 55 06/30/2022    BILITOT 0.4 06/30/2022     Lab Results   Component Value Date    INR 0.9 05/22/2006     Lab Results   Component Value Date    HGBA1C 5.2 05/13/2021       Diagnostic Results:    2 D echo 6/28/22:  · The left ventricle is mildly enlarged with mild concentric hypertrophy and normal systolic function.  · Normal right ventricular size with normal right ventricular systolic function.  · The estimated ejection fraction is 55%.  · Prominent LV apical trabeculations. Borderline criteria for LV non compaction. Consider cardiac MRI for better evaluation  · Severe left atrial enlargement.  · Indeterminate left ventricular diastolic function.  · Normal central venous pressure (3 mmHg).  · The estimated PA systolic pressure is 24 mmHg.  · There is mild aortic valve stenosis.  · Aortic valve area is 1.78 cm2; peak velocity is 2.50 m/s; mean gradient is 14 mmHg.    ASSESSMENT & PLAN:       Acute exacerbation of chronic obstructive pulmonary disease (COPD)  Acute respiratory failure with hypoxia and hypercarbia  - continue current medication regimen  - see Pulmonary team 8/30/22  - wear oxygen continuously as directed   - patient 87% on RA at rest in office today, he left oxygen in his car    Syncope, unspecified syncope type  - etiology unclear, needs further cardiac work up  - cardiac MRI pending 9/6/22  - 30 day event monitor pending  - cardiology follow up to be arranged after MRI    Other sleep apnea  - see sleep med team 8/5/22    Enlarged prostate  -     tamsulosin (FLOMAX) 0.4 mg Cap; Take 2  capsules (0.8 mg total) by mouth once daily.  Dispense: 60 capsule; Refill: 3    Other orders  -     budesonide-formoterol 80-4.5 mcg (SYMBICORT) 80-4.5 mcg/actuation HFAA; Inhale 2 puffs into the lungs once daily. Controller  Dispense: 6.9 g; Refill: 11    Patient will be released from [facility].  He will see his PCP at [facility] as scheduled.  Records will be shared for coordination of care.     Instructions for the patient:      Scheduled Follow-up :  Future Appointments   Date Time Provider Department Center   8/2/2022  8:00 AM Scott Pollard, PT [facility] OP [facility]   8/5/2022  9:20 AM Cristiana Mora NP [facility] SLEEP [facility]   8/30/2022  3:00 PM Ignacio Rosales MD [facility] [address]   9/6/2022  8:00 AM [facility] [id number] [facility]       Post Visit Medication List:     Medication List          Accurate as of July 11, 2022 11:59 PM. If you have any questions, ask your nurse or doctor.            CONTINUE taking these medications    albuterol sulfate 2.5 mg/0.5 mL Nebu  Take 2.5 mg by nebulization every 6 (six) hours as needed (SOB and wheezing). Rescue     amLODIPine 5 MG tablet  Commonly known as: NORVASC     atorvastatin 10 MG tablet  Commonly known as: LIPITOR     budesonide-formoterol 80-4.5 mcg 80-4.5 mcg/actuation Hfaa  Commonly known as: SYMBICORT  Inhale 2 puffs into the lungs once daily. Controller     ibuprofen 200 MG tablet  Commonly known as: ADVIL,MOTRIN     ketorolac 10 mg tablet  Commonly known as: TORADOL     losartan 25 MG tablet  Commonly known as: COZAAR  Take 1 tablet (25 mg total) by mouth once daily.     methocarbamoL 500 MG Tab  Commonly known as: ROBAXIN     naproxen sodium 220 MG tablet  Commonly known as: ANAPROX     tamsulosin 0.4 mg Cap  Commonly known as: FLOMAX  Take 2 capsules (0.8 mg total) by mouth once daily.     traMADoL 50 mg tablet  Commonly known as: ULTRAM           Where to Get Your Medications      These medications were sent  to [facility] [facility] [address]  [address] [address]    Phone: [phone]   · budesonide-formoterol 80-4.5 mcg 80-4.5 mcg/actuation Hfaa  · tamsulosin 0.4 mg Cap         Signing Physician:  Lis Chilel MD

## 2022-07-14 PROBLEM — G47.39 OTHER SLEEP APNEA: Status: ACTIVE | Noted: 2022-07-14

## 2022-08-04 ENCOUNTER — TELEPHONE (OUTPATIENT)
Dept: SLEEP MEDICINE | Facility: CLINIC | Age: 62
End: 2022-08-04
Payer: MEDICAID

## 2022-08-05 ENCOUNTER — TELEPHONE (OUTPATIENT)
Dept: SLEEP MEDICINE | Facility: CLINIC | Age: 62
End: 2022-08-05

## 2022-08-05 NOTE — TELEPHONE ENCOUNTER
JOSE in regards to appointment he missed with Cristiana. He can call back to reschedule. Not active on the [facility] Portal.

## 2022-08-12 ENCOUNTER — DOCUMENTATION ONLY (OUTPATIENT)
Dept: CARDIOLOGY | Facility: HOSPITAL | Age: 62
End: 2022-08-12
Payer: MEDICAID

## 2022-08-12 NOTE — PROGRESS NOTES
Per [employer], patient received 30 day event monitor by mail; however, did not proceed with testing.

## 2023-08-08 NOTE — NURSING
Pt off the unit via wheelchair to ECHO, NAD.   83yF PMHx of HTN and HLD presents as transfer from [place] after falling face first off her bed after sitting up quickly. CT C-spine showed C5 acute anterior inferior corner/osteophyte fx.

## 2024-03-06 ENCOUNTER — HOSPITAL ENCOUNTER (EMERGENCY)
Facility: HOSPITAL | Age: 64
Discharge: HOME | End: 2024-03-07
Attending: EMERGENCY MEDICINE
Payer: MEDICAID

## 2024-03-06 DIAGNOSIS — T14.90XA INJURY: ICD-10-CM

## 2024-03-06 DIAGNOSIS — S99.929A TOE INJURY: ICD-10-CM

## 2024-03-06 DIAGNOSIS — S92.502A CLOSED FRACTURE OF PHALANX OF LEFT THIRD TOE, INITIAL ENCOUNTER: Primary | ICD-10-CM

## 2024-03-06 PROCEDURE — 25000003 PHARM REV CODE 250: Performed by: NURSE PRACTITIONER

## 2024-03-06 PROCEDURE — 99285 EMERGENCY DEPT VISIT HI MDM: CPT | Mod: 25

## 2024-03-06 RX ORDER — IBUPROFEN 600 MG/1
600 TABLET ORAL
Status: COMPLETED | OUTPATIENT
Start: 2024-03-06 | End: 2024-03-06

## 2024-03-06 RX ORDER — OXYCODONE AND ACETAMINOPHEN 5; 325 MG/1; MG/1
1 TABLET ORAL
Status: COMPLETED | OUTPATIENT
Start: 2024-03-06 | End: 2024-03-06

## 2024-03-06 RX ORDER — HYDROCODONE BITARTRATE AND ACETAMINOPHEN 5; 325 MG/1; MG/1
1 TABLET ORAL EVERY 6 HOURS PRN
Qty: 8 TABLET | Refills: 0 | Status: SHIPPED | OUTPATIENT
Start: 2024-03-06

## 2024-03-06 RX ADMIN — OXYCODONE HYDROCHLORIDE AND ACETAMINOPHEN 1 TABLET: 5; 325 TABLET ORAL at 10:03

## 2024-03-06 RX ADMIN — IBUPROFEN 600 MG: 600 TABLET, FILM COATED ORAL at 10:03

## 2024-03-07 VITALS
RESPIRATION RATE: 16 BRPM | DIASTOLIC BLOOD PRESSURE: 73 MMHG | WEIGHT: 315 LBS | SYSTOLIC BLOOD PRESSURE: 139 MMHG | HEART RATE: 78 BPM | OXYGEN SATURATION: 94 % | TEMPERATURE: 98 F | BODY MASS INDEX: 58.73 KG/M2

## 2024-03-07 NOTE — ED PROVIDER NOTES
Encounter Date: 3/6/2024       History     Chief Complaint   Patient presents with    Toe Injury     Trip and fall today injuring 3rd digit on left foot. States he has been able to bear weight.      63-year-old male presents to the ED for a left toe injury.  The patient reports that he injured his left 3rd toe after a trip and fall today.  He has been able to bear weight however the pain significantly worsens with ambulation.  No wounds.  He denies past medical history of diabetes.  No other complaints at this time.  Of note, the patient had an oxygen saturation of 90% on room air upon arrival to the ED.  he does use oxygen at home but states he does not like to carry a tank.  Reports chronic shortness of breath but no worse than usual.  No other complaints.    The history is provided by the patient.     Review of patient's allergies indicates:  No Known Allergies  No past medical history on file.  No past surgical history on file.  No family history on file.  Social History     Tobacco Use    Smoking status: Every Day     Types: Vaping with nicotine    Smokeless tobacco: Current   Substance Use Topics    Alcohol use: Yes     Alcohol/week: 6.0 standard drinks of alcohol     Types: 6 Cans of beer per week     Comment: daily    Drug use: Yes     Types: Marijuana     Review of Systems   Constitutional:  Positive for activity change.   Respiratory:  Positive for shortness of breath (chronic, unchanged from baseline.). Negative for cough.    Cardiovascular:  Negative for leg swelling.   Musculoskeletal:  Positive for arthralgias and joint swelling.   Skin:  Negative for color change, rash and wound.   Neurological:  Negative for weakness and numbness.   Psychiatric/Behavioral:  Negative for confusion.        Physical Exam     Initial Vitals [03/06/24 2021]   BP Pulse Resp Temp SpO2   139/73 82 19 97.9 °F (36.6 °C) (!) 90 %      MAP       --         Physical Exam    Nursing note and vitals reviewed.  Constitutional: He  appears well-developed and well-nourished. He is Obese . He is active and cooperative. He is easily aroused.  Non-toxic appearance. He does not have a sickly appearance. He does not appear ill. No distress.   HENT:   Head: Normocephalic and atraumatic.   Nose: Nose normal.   Mouth/Throat: Mucous membranes are normal.   Eyes: Conjunctivae are normal.   Neck: Phonation normal.   Normal range of motion.   Full passive range of motion without pain.     Cardiovascular:  Normal rate.           Pulmonary/Chest: Effort normal.   Comfortable work of breathing. Pt speaking in full sentences.    Musculoskeletal:      Cervical back: Full passive range of motion without pain and normal range of motion.      Right ankle: Normal.      Left ankle: Normal.      Right foot: Normal.      Left foot: Normal range of motion and normal capillary refill. Swelling, deformity (left 3rd toe), tenderness and bony tenderness present. No bunion, Charcot foot, foot drop, prominent metatarsal heads, laceration or crepitus. Normal pulse.     Neurological: He is alert, oriented to person, place, and time and easily aroused. Coordination normal.   Skin: Skin is intact. No bruising and no rash noted.   Psychiatric: He has a normal mood and affect. His speech is normal and behavior is normal. Judgment and thought content normal. Cognition and memory are normal.         ED Course   Orthopedic Injury    Date/Time: 3/6/2024 11:14 PM    Performed by: Lucinda Pinzon FNP  Authorized by: Lucinda Pinzon FNP    Location procedure was performed:  [facility] EMERGENCY DEPARTMENT  Consent Done?:  Emergent Situation  Injury:     Injury location:  Toe    Injury type:  Fracture-dislocation    Fracture type: proximal phalanx        Pre-procedure assessment:     Neurovascular status: Neurovascularly intact      Distal perfusion: normal      Neurological function: normal      Range of motion: reduced      Local anesthesia used?: No      Patient  sedated?: No        Selections made in this section will also lock the Injury type section above.:     Manipulation performed?: Yes      Skin traction used?: No      Skeletal traction used?: No      Reduction successful?: Yes      Confirmation: Reduction confirmed by x-ray      Splint type: Post op shoe with hugo taping toe #2 and #3.    Complications: No      Specimens: No      Implants: No    Post-procedure assessment:     Neurovascular status: Neurovascularly intact      Distal perfusion: normal      Neurological function: normal      Range of motion: splinted      Patient tolerance:  Patient tolerated the procedure well with no immediate complications    Labs Reviewed - No data to display       Imaging Results              X-Ray Foot Complete Left (Final result)  Result time 03/06/24 21:13:56      Final result by Gregory Ireland DO (03/06/24 21:13:56)                   Impression:      Fracture/dislocation of the 3rd toe proximal interphalangeal joint.      Electronically signed by: Gregory Ireland  Date:    03/06/2024  Time:    21:13               Narrative:    EXAMINATION:  XR FOOT COMPLETE 3 VIEW LEFT    CLINICAL HISTORY:  .  Injury, unspecified, initial encounter    TECHNIQUE:  AP, lateral and oblique views of the left foot were performed.    COMPARISON:  None    FINDINGS:  There is dorsal dislocation of the 3rd toe proximal interphalangeal joint with a small avulsion fracture of the middle phalangeal base.  No additional fractures are seen.  Alignment is normal. The Lisfranc articulation is congruent. Joint spaces are preserved.  There is soft tissue edema of the 3rd toe.                                       Medications   ibuprofen tablet 600 mg (600 mg Oral Given 3/6/24 2256)   oxyCODONE-acetaminophen 5-325 mg per tablet 1 tablet (1 tablet Oral Given 3/6/24 2256)     Medical Decision Making  63-year-old male here for left toe pain after trip and fall today.  No head injury.  Tenderness to palpation of  the left 3rd toe with deformity at the PIP joint.  Neurovascularly intact distal injury.  No wounds.  The patient has a fracture dislocation of the left 3rd toe PIP joint.  He was provided with Motrin and [place] while in the ED. offered digital block for reduction but patient declined. Repeat xray obtained after closed reduction which showed improved alignment.     Pt was provided with ambulatory referral for podiatry. Reviewed narcotic prescription precautions.     Pt to follow up with podiatry within 2 weeks.  I reviewed strict return precautions. In addition, pt is to return to the ED if condition changes, progresses, or if there are any concerns.  Pt verbalized understanding, compliance, and agreement with the treatment plan.        Reviewed with  who agrees with ED course and dispo.          Amount and/or Complexity of Data Reviewed  Radiology: ordered. Decision-making details documented in ED Course.    Risk  OTC drugs.  Prescription drug management.               ED Course as of 03/07/24 1402   Thu Mar 07, 2024   0027 X-Ray Toe 2 or More Views Left  Foot x-ray independently interpreted: improved   Agree with radiologist interpretation.    [SS]      ED Course User Index  [SS] Jin Moncada MD                           Clinical Impression:  Final diagnoses:  [T14.90XA] Injury  [T14.90XA] Injury - 3rd digit  [S99.929A] Toe injury  [S92.502A] Closed fracture of phalanx of left third toe, initial encounter (Primary)                 Lucinda Pinzon, P  03/07/24 1402

## 2024-03-07 NOTE — DISCHARGE INSTRUCTIONS
You may also take motrin to help with pain.     Please wear your oxygen as prescribed.     Return to the ED if your condition changes, progresses, or if you have any concerns.      Thank you for choosing [facility]! We appreciate you coming to us for your medical care. We hope you feel better soon! Please come back to [facility] for all of your future medical needs.      Our goal in the emergency department is to always give you outstanding care and exceptional service. You may receive a survey by mail or e-mail in the next week regarding your experience in our ED. We would greatly appreciate your completing and returning the survey. Your feedback provides us with a way to recognize our staff who give very good care and it helps us learn how to improve when your experience was below our aspiration of excellence.      Sincerely,  ONEIL Davidson

## 2025-03-25 ENCOUNTER — HOSPITAL ENCOUNTER (EMERGENCY)
Facility: HOSPITAL | Age: 65
Discharge: HOME | End: 2025-03-25
Attending: EMERGENCY MEDICINE
Payer: MEDICAID

## 2025-03-25 VITALS
RESPIRATION RATE: 18 BRPM | TEMPERATURE: 98 F | OXYGEN SATURATION: 97 % | WEIGHT: 315 LBS | HEIGHT: 67 IN | DIASTOLIC BLOOD PRESSURE: 65 MMHG | BODY MASS INDEX: 49.44 KG/M2 | HEART RATE: 58 BPM | SYSTOLIC BLOOD PRESSURE: 133 MMHG

## 2025-03-25 DIAGNOSIS — R07.9 CHEST PAIN: ICD-10-CM

## 2025-03-25 DIAGNOSIS — R07.89 CHEST WALL PAIN: Primary | ICD-10-CM

## 2025-03-25 LAB
ABSOLUTE EOSINOPHIL (OHS): 0.06 K/UL
ABSOLUTE MONOCYTE (OHS): 0.43 K/UL (ref 0.3–1)
ABSOLUTE NEUTROPHIL COUNT (OHS): 3.15 K/UL (ref 1.8–7.7)
ALBUMIN SERPL BCP-MCNC: 3.4 G/DL (ref 3.5–5.2)
ALP SERPL-CCNC: 60 UNIT/L (ref 40–150)
ALT SERPL W/O P-5'-P-CCNC: 16 UNIT/L (ref 10–44)
ANION GAP (OHS): 13 MMOL/L (ref 8–16)
AST SERPL-CCNC: 15 UNIT/L (ref 11–45)
BASOPHILS # BLD AUTO: 0.01 K/UL
BASOPHILS NFR BLD AUTO: 0.2 %
BILIRUB SERPL-MCNC: 0.2 MG/DL (ref 0.1–1)
BNP SERPL-MCNC: 97 PG/ML (ref 0–99)
BUN SERPL-MCNC: 11 MG/DL (ref 8–23)
CALCIUM SERPL-MCNC: 9.1 MG/DL (ref 8.7–10.5)
CHLORIDE SERPL-SCNC: 105 MMOL/L (ref 95–110)
CO2 SERPL-SCNC: 23 MMOL/L (ref 23–29)
CREAT SERPL-MCNC: 1.2 MG/DL (ref 0.5–1.4)
D DIMER PPP IA.FEU-MCNC: 0.25 MG/L FEU
ERYTHROCYTE [DISTWIDTH] IN BLOOD BY AUTOMATED COUNT: 12.6 % (ref 11.5–14.5)
GFR SERPLBLD CREATININE-BSD FMLA CKD-EPI: >60 ML/MIN/1.73/M2
GLUCOSE SERPL-MCNC: 91 MG/DL (ref 70–110)
HCT VFR BLD AUTO: 43.8 % (ref 40–54)
HGB BLD-MCNC: 14.7 GM/DL (ref 14–18)
IMM GRANULOCYTES # BLD AUTO: 0.02 K/UL (ref 0–0.04)
IMM GRANULOCYTES NFR BLD AUTO: 0.4 % (ref 0–0.5)
LYMPHOCYTES # BLD AUTO: 1.77 K/UL (ref 1–4.8)
MCH RBC QN AUTO: 33.3 PG (ref 27–50)
MCHC RBC AUTO-ENTMCNC: 33.6 G/DL (ref 32–36)
MCV RBC AUTO: 99 FL (ref 82–98)
NUCLEATED RBC (/100WBC) (OHS): 0 /100 WBC
PLATELET # BLD AUTO: 169 K/UL (ref 150–450)
PMV BLD AUTO: 9 FL (ref 9.2–12.9)
POTASSIUM SERPL-SCNC: 4 MMOL/L (ref 3.5–5.1)
PROT SERPL-MCNC: 7.6 GM/DL (ref 6–8.4)
RBC # BLD AUTO: 4.41 M/UL (ref 4.6–6.2)
RELATIVE EOSINOPHIL (OHS): 1.1 %
RELATIVE LYMPHOCYTE (OHS): 32.5 % (ref 18–48)
RELATIVE MONOCYTE (OHS): 7.9 % (ref 4–15)
RELATIVE NEUTROPHIL (OHS): 57.9 % (ref 38–73)
SODIUM SERPL-SCNC: 141 MMOL/L (ref 136–145)
TROPONIN I SERPL DL<=0.01 NG/ML-MCNC: <0.006 NG/ML
WBC # BLD AUTO: 5.44 K/UL (ref 3.9–12.7)

## 2025-03-25 PROCEDURE — 25000003 PHARM REV CODE 250: Performed by: EMERGENCY MEDICINE

## 2025-03-25 PROCEDURE — 99285 EMERGENCY DEPT VISIT HI MDM: CPT | Mod: 25

## 2025-03-25 PROCEDURE — 93005 ELECTROCARDIOGRAM TRACING: CPT

## 2025-03-25 PROCEDURE — 80053 COMPREHEN METABOLIC PANEL: CPT | Performed by: EMERGENCY MEDICINE

## 2025-03-25 PROCEDURE — 83880 ASSAY OF NATRIURETIC PEPTIDE: CPT | Performed by: EMERGENCY MEDICINE

## 2025-03-25 PROCEDURE — 84484 ASSAY OF TROPONIN QUANT: CPT | Performed by: EMERGENCY MEDICINE

## 2025-03-25 PROCEDURE — 85379 FIBRIN DEGRADATION QUANT: CPT | Performed by: EMERGENCY MEDICINE

## 2025-03-25 PROCEDURE — 93010 ELECTROCARDIOGRAM REPORT: CPT | Mod: ,,, | Performed by: INTERNAL MEDICINE

## 2025-03-25 PROCEDURE — 96374 THER/PROPH/DIAG INJ IV PUSH: CPT

## 2025-03-25 PROCEDURE — 85025 COMPLETE CBC W/AUTO DIFF WBC: CPT | Performed by: EMERGENCY MEDICINE

## 2025-03-25 PROCEDURE — 63600175 PHARM REV CODE 636 W HCPCS: Mod: JZ,TB | Performed by: EMERGENCY MEDICINE

## 2025-03-25 RX ORDER — KETOROLAC TROMETHAMINE 30 MG/ML
15 INJECTION, SOLUTION INTRAMUSCULAR; INTRAVENOUS
Status: COMPLETED | OUTPATIENT
Start: 2025-03-25 | End: 2025-03-25

## 2025-03-25 RX ORDER — ASPIRIN 325 MG
325 TABLET ORAL
Status: COMPLETED | OUTPATIENT
Start: 2025-03-25 | End: 2025-03-25

## 2025-03-25 RX ADMIN — ASPIRIN 325 MG ORAL TABLET 325 MG: 325 PILL ORAL at 02:03

## 2025-03-25 RX ADMIN — KETOROLAC TROMETHAMINE 15 MG: 30 INJECTION, SOLUTION INTRAMUSCULAR at 03:03

## 2025-03-25 NOTE — ED PROVIDER NOTES
Encounter Date: 3/25/2025       History     Chief Complaint   Patient presents with    Chest Pain     Patient reports recurring left rib pain that he has been seen for previously. He endorses a dry cough recently. His pain worsens with cough and deep inspiration. He reports feeling more short of breath than usual.      64-year-old morbidly obese male with a past medical history of hypertension and smoking presents the emergency room for evaluation of left-sided chest pain that is been present for the past 2-3 days.  He saw his primary care doctor today referred him to the emergency room.  Patient no longer smokes cigarettes but does vape.  He has reproducible left-sided chest pain that last a few seconds in his time is stabbing in nature nonradiating and hurts more when he takes a deep breath.  He denies any fevers or chills but has had a cough.  He denies any history of DVT pulmonary embolism coronary artery disease back pain dysuria abdominal pain nausea vomiting diarrhea exertional angina orthopnea or PND      Review of patient's allergies indicates:  No Known Allergies  History reviewed. No pertinent past medical history.  History reviewed. No pertinent surgical history.  No family history on file.  Social History[1]  Review of Systems   Constitutional:  Negative for appetite change, fatigue and fever.   HENT:  Negative for congestion, ear pain, rhinorrhea and sore throat.    Eyes:  Negative for pain and visual disturbance.   Respiratory:  Negative for cough, choking, chest tightness, shortness of breath, wheezing and stridor.    Cardiovascular:  Positive for chest pain. Negative for leg swelling.   Gastrointestinal:  Negative for abdominal pain, diarrhea, nausea and vomiting.   Genitourinary:  Negative for difficulty urinating.   Musculoskeletal:  Negative for arthralgias.   Skin:  Negative for rash.   Neurological:  Negative for weakness, numbness and headaches.   All other systems reviewed and are  negative.      Physical Exam     Initial Vitals [03/25/25 0135]   BP Pulse Resp Temp SpO2   (!) 156/73 70 (!) 22 97.7 °F (36.5 °C) (!) 90 %      MAP       --         Physical Exam    Nursing note and vitals reviewed.  Constitutional: He appears well-developed and well-nourished. No distress.   Morbidly obese   HENT:   Head: Normocephalic and atraumatic. Mouth/Throat: Oropharynx is clear and moist.   Eyes: Conjunctivae and EOM are normal. Pupils are equal, round, and reactive to light.   Neck: Neck supple.   Normal range of motion.  Cardiovascular:  Normal rate, regular rhythm, normal heart sounds and intact distal pulses.     Exam reveals no gallop and no friction rub.       No murmur heard.  Pulmonary/Chest: Breath sounds normal. He has no wheezes. He has no rhonchi. He has no rales. He exhibits tenderness (reproducible left anterior lateral chest wall tenderness.).   Abdominal: Abdomen is soft. Bowel sounds are normal. There is no abdominal tenderness.   Musculoskeletal:         General: No edema. Normal range of motion.      Cervical back: Normal range of motion and neck supple.     Neurological: He is alert and oriented to person, place, and time. He has normal strength. No sensory deficit. GCS score is 15. GCS eye subscore is 4. GCS verbal subscore is 5. GCS motor subscore is 6.   Skin: Skin is warm and dry.   Psychiatric: He has a normal mood and affect.         ED Course   Procedures  Labs Reviewed   COMPREHENSIVE METABOLIC PANEL - Abnormal       Result Value    Sodium 141      Potassium 4.0      Chloride 105      CO2 23      Glucose 91      BUN 11      Creatinine 1.2      Calcium 9.1      Protein Total 7.6      Albumin 3.4 (*)     Bilirubin Total 0.2      ALP 60      AST 15      ALT 16      Anion Gap 13      eGFR >60     CBC WITH DIFFERENTIAL - Abnormal    WBC 5.44      RBC 4.41 (*)     HGB 14.7      HCT 43.8      MCV 99 (*)     MCH 33.3      MCHC 33.6      RDW 12.6      Platelet Count 169      MPV 9.0 (*)      Nucleated RBC 0      Neut % 57.9      Lymph % 32.5      Mono % 7.9      Eos % 1.1      Basophil % 0.2      Imm Grans % 0.4      Neut # 3.15      Lymph # 1.77      Mono # 0.43      Eos # 0.06      Baso # 0.01      Imm Grans # 0.02     TROPONIN I - Normal    Troponin-I <0.006     TROPONIN I - Normal    Troponin-I <0.006     B-TYPE NATRIURETIC PEPTIDE - Normal    BNP 97     D DIMER, QUANTITATIVE - Normal    D-Dimer 0.25     TROPONIN I - Normal    Troponin-I <0.006     CBC W/ AUTO DIFFERENTIAL    Narrative:     The following orders were created for panel order CBC auto differential.  Procedure                               Abnormality         Status                     ---------                               -----------         ------                     CBC with Differential[[id number]]       Abnormal            Final result                 Please view results for these tests on the individual orders.        ECG Results              EKG 12-lead (Final result)        Collection Time Result Time QRS Duration OHS QTC Calculation    03/25/25 01:51:27 03/26/25 16:54:00 124 464                     Final result by Interface, Lab In [facility] (03/26/25 16:54:08)                   Narrative:    Test Reason : R07.9,    Vent. Rate :  73 BPM     Atrial Rate :  73 BPM     P-R Int : 214 ms          QRS Dur : 124 ms      QT Int : 422 ms       P-R-T Axes : -18 -30 189 degrees    QTcB Int : 464 ms    Sinus rhythm with 1st degree A-V block with frequent Premature ventricular  complexes  Left axis deviation  Nonspecific intraventricular conduction delay  ST and T wave abnormality, consider inferior ischemia  ST and T wave abnormality, consider anterolateral ischemia  Abnormal ECG  When compared with ECG of 27-Jun-2022 14:27,  Premature ventricular complexes are now Present  Confirmed by Laquita Posey ([id number]) on 3/26/2025 4:53:53 PM    Referred By: AAAREFERRAL SELF           Confirmed By: Laquita Posey                                      EKG 12-lead (Final result)  Result time 03/28/25 14:16:35      Final result by Unknown User (03/28/25 14:16:35)                                      Imaging Results              X-Ray Ribs 2 View Left (Final result)  Result time 03/25/25 03:32:25      Final result by Marlen Shea MD (03/25/25 03:32:25)                   Impression:      Please see above.      Electronically signed by: Marlen Shea MD  Date:    03/25/2025  Time:    03:32               Narrative:    EXAMINATION:  XR CHEST AP PORTABLE; XR RIBS 2 VIEW LEFT    CLINICAL HISTORY:  Chest Pain; Chest pain, unspecified    TECHNIQUE:  Single frontal view of the chest was performed.    Two views of the left ribs were performed.    COMPARISON:  06/19/2019    FINDINGS:  Cardiac monitoring leads overlie the chest.  There is unchanged prominence of the cardiomediastinal silhouette.  The lungs are symmetrically expanded with increased bibasilar interstitial and parenchymal attenuation which appears similar to prior examination.  No evidence of confluent airspace consolidation, substantial volume of pleural fluid or pneumothorax.  Visualized osseous and articular structures appear intact with degenerative change.  No definite radiographic evidence of acute displaced left-sided rib fractures allowing for artifact from overlying cardiac monitoring leads.                                       X-Ray Chest AP Portable (Final result)  Result time 03/25/25 03:32:25      Final result by Marlen Shea MD (03/25/25 03:32:25)                   Impression:      Please see above.      Electronically signed by: Marlen Shea MD  Date:    03/25/2025  Time:    03:32               Narrative:    EXAMINATION:  XR CHEST AP PORTABLE; XR RIBS 2 VIEW LEFT    CLINICAL HISTORY:  Chest Pain; Chest pain, unspecified    TECHNIQUE:  Single frontal view of the chest was performed.    Two views of the left ribs were performed.    COMPARISON:  06/19/2019    FINDINGS:  Cardiac  monitoring leads overlie the chest.  There is unchanged prominence of the cardiomediastinal silhouette.  The lungs are symmetrically expanded with increased bibasilar interstitial and parenchymal attenuation which appears similar to prior examination.  No evidence of confluent airspace consolidation, substantial volume of pleural fluid or pneumothorax.  Visualized osseous and articular structures appear intact with degenerative change.  No definite radiographic evidence of acute displaced left-sided rib fractures allowing for artifact from overlying cardiac monitoring leads.                                       Medications   aspirin tablet 325 mg (325 mg Oral Given 3/25/25 0218)   ketorolac injection 15 mg (15 mg Intravenous Given 3/25/25 0333)     Medical Decision Making  Amount and/or Complexity of Data Reviewed  Labs: ordered.  Radiology: ordered.    Risk  OTC drugs.  Prescription drug management.      Additional MDM:   Heart Score:    History:          Slightly suspicious.  ECG:             Nonspecific repolarisation disturbance  Age:               45-65 years  Risk factors: >= 3 risk factors or history of atherosclerotic disease  Troponin:       Less than or equal to normal limit  Heart Score = 4                ED Course as of 03/29/25 0659   Tue Mar 25, 2025   0232 EKG independently interpreted by me as rate 73 normal sinus rhythm with first-degree AV block frequent PVCs T-wave inversion lead 1 aVL V3 through V6 could be secondary to LVH.  No ST segment elevation or depression [JS]   0318 Chest x-ray independently interpreted by me shows no signs of acute cardiopulmonary process [JS]   0359 D-dimer negative troponin negative and BNP within normal limits. [JS]   0400 Chest x-ray really does not show any signs of acute cardiopulmonary process from Radiology.  Patient definitely has reproducible chest discomfort which is sharp in nature. [JS]   4217 I reassessed the patient.  He definitely has reproducible  tenderness on palpation of the chest wall directly where he is complaining of pain.  There is no rash.  Patient states he has been coughing.  He is morbidly obese and has a oxygen saturation of 90% on room air.  He is a smoker.  There was no signs of pneumothorax or pneumonia.  Patient has hypertension hyperlipidemia and smoking.  Given his age and nonspecific EKG abnormality he does have a heart score 4 however he definitely has reproducible chest pain on exam.  Rib x-rays are negative.  Awaiting a 2nd troponin.  If negative patient would like to go home and we did shared decision-making.  Patient would like to follow up with the cardiologist as an outpatient. [JS]      ED Course User Index  [JS] Harmeet Chowdhury MD                           Clinical Impression:  Final diagnoses:  [R07.9] Chest pain  [R07.89] Chest wall pain (Primary)          ED Disposition Condition    Discharge Stable          ED Prescriptions    None       Follow-up Information       Follow up With Specialties Details Why Contact Info    Clark Rousseau MD Interventional Cardiology, Cardiology Schedule an appointment as soon as possible for a visit   [address]  [address]  [facility] [address]  [phone]      [facility] Emergency Dept Emergency Medicine  If symptoms worsen [address] [address] [phone]               [1]   Social History  Tobacco Use    Smoking status: Every Day     Types: Vaping with nicotine    Smokeless tobacco: Current   Substance Use Topics    Alcohol use: Yes     Alcohol/week: 6.0 standard drinks of alcohol     Types: 6 Cans of beer per week     Comment: daily    Drug use: Yes     Types: Marijuana        Harmeet Chowdhury MD  03/29/25 0659

## 2025-03-25 NOTE — DISCHARGE INSTRUCTIONS
Return the ER for worsening chest pain.  I believe your chest pain is in the chest wall.  However if you have worsening chest pain shortness breath or any other concerning symptoms return the ER.  We have referred you to the cardiologist.  Call today to schedule follow up appointment.

## 2025-03-26 LAB
OHS QRS DURATION: 124 MS
OHS QTC CALCULATION: 464 MS